# Patient Record
Sex: FEMALE | Race: BLACK OR AFRICAN AMERICAN | NOT HISPANIC OR LATINO | Employment: OTHER | URBAN - METROPOLITAN AREA
[De-identification: names, ages, dates, MRNs, and addresses within clinical notes are randomized per-mention and may not be internally consistent; named-entity substitution may affect disease eponyms.]

---

## 2017-01-06 ENCOUNTER — OFFICE VISIT (OUTPATIENT)
Dept: FAMILY MEDICINE | Facility: CLINIC | Age: 32
End: 2017-01-06
Payer: MEDICAID

## 2017-01-06 VITALS
HEIGHT: 64 IN | RESPIRATION RATE: 15 BRPM | SYSTOLIC BLOOD PRESSURE: 110 MMHG | WEIGHT: 110.25 LBS | TEMPERATURE: 99 F | DIASTOLIC BLOOD PRESSURE: 82 MMHG | BODY MASS INDEX: 18.82 KG/M2 | OXYGEN SATURATION: 96 % | HEART RATE: 68 BPM

## 2017-01-06 DIAGNOSIS — G89.29 CHRONIC BILATERAL LOW BACK PAIN WITHOUT SCIATICA: Primary | ICD-10-CM

## 2017-01-06 DIAGNOSIS — M54.50 CHRONIC BILATERAL LOW BACK PAIN WITHOUT SCIATICA: Primary | ICD-10-CM

## 2017-01-06 DIAGNOSIS — R63.4 UNINTENDED WEIGHT LOSS: ICD-10-CM

## 2017-01-06 PROCEDURE — 99214 OFFICE O/P EST MOD 30 MIN: CPT | Mod: S$PBB,,, | Performed by: FAMILY MEDICINE

## 2017-01-06 PROCEDURE — 99999 PR PBB SHADOW E&M-EST. PATIENT-LVL III: CPT | Mod: PBBFAC,,, | Performed by: FAMILY MEDICINE

## 2017-01-06 PROCEDURE — 99213 OFFICE O/P EST LOW 20 MIN: CPT | Mod: PBBFAC,PO | Performed by: FAMILY MEDICINE

## 2017-01-06 RX ORDER — TRAMADOL HYDROCHLORIDE 50 MG/1
50 TABLET ORAL 4 TIMES DAILY PRN
Refills: 2 | COMMUNITY
Start: 2016-12-10 | End: 2017-01-06 | Stop reason: SDUPTHER

## 2017-01-06 RX ORDER — CYPROHEPTADINE HYDROCHLORIDE 2 MG/5ML
4 SOLUTION ORAL
Qty: 300 ML | Refills: 2 | Status: SHIPPED | OUTPATIENT
Start: 2017-01-06 | End: 2017-03-24 | Stop reason: SDUPTHER

## 2017-01-06 RX ORDER — TRAMADOL HYDROCHLORIDE 50 MG/1
50 TABLET ORAL 4 TIMES DAILY PRN
Qty: 120 TABLET | Refills: 2 | Status: SHIPPED | OUTPATIENT
Start: 2017-01-06 | End: 2017-03-23 | Stop reason: SDUPTHER

## 2017-01-06 NOTE — PROGRESS NOTES
Chief Complaint   Patient presents with    Annual Exam    Gynecologic Exam     PAP Smear       Marlene Munoz is a 31 y.o. female who presents per the Chief Complaint.  Pt is known to me and was last seen by me on 11/11/2016.  All known chronic medical issues have been documented.  Patient unable to stay for annual exam and Pap and will return next week.       Anxiety   Presents for follow-up visit. Onset was at an unknown time. The problem has been unchanged. Symptoms include excessive worry, irritability, nervous/anxious behavior and palpitations. Patient reports no chest pain, compulsions, confusion, decreased concentration, depressed mood, dizziness, dry mouth, feeling of choking, hyperventilation, insomnia, malaise, muscle tension, nausea, obsessions, panic, restlessness, shortness of breath or suicidal ideas. Symptoms occur most days. The severity of symptoms is severe. The symptoms are aggravated by family issues. The quality of sleep is fair. Nighttime awakenings: occasional.     There are no known risk factors. Her past medical history is significant for anxiety/panic attacks. There is no history of anemia, arrhythmia, bipolar disorder, CAD, CHF, chronic lung disease, depression, fibromyalgia or hyperthyroidism. Past treatments include benzodiazephines. The treatment provided moderate relief. Compliance with prior treatments has been variable. Side effects of treatment include auditory problems, GI discomfort, headaches, joint pain, a rash, urinary problems, visual problems and limb pain.   Back Pain   This is a chronic problem. The current episode started more than 1 year ago. The problem occurs daily. The problem is unchanged. The pain is present in the lumbar spine. The quality of the pain is described as aching. The pain does not radiate. The pain is at a severity of 5/10. The pain is moderate. The pain is worse during the night. The symptoms are aggravated by sitting and twisting. Pertinent  "negatives include no abdominal pain, bladder incontinence, bowel incontinence, chest pain, dysuria, fever, headaches, leg pain, numbness, paresis, paresthesias, pelvic pain, perianal numbness, tingling, weakness or weight loss. She has tried analgesics for the symptoms. The treatment provided moderate relief.        ROS  Review of Systems   Constitutional: Positive for irritability. Negative for activity change, appetite change, chills, diaphoresis, fatigue, fever, unexpected weight change and weight loss.   HENT: Negative.  Negative for congestion, ear pain, hearing loss, nosebleeds, postnasal drip, rhinorrhea, sinus pressure, sneezing, sore throat and trouble swallowing.    Eyes: Negative for pain and visual disturbance.   Respiratory: Negative for cough, choking and shortness of breath.    Cardiovascular: Positive for palpitations. Negative for chest pain and leg swelling.   Gastrointestinal: Negative for abdominal pain, bowel incontinence, constipation, diarrhea, nausea and vomiting.   Genitourinary: Negative for bladder incontinence, difficulty urinating, dysuria, frequency, pelvic pain and urgency.   Musculoskeletal: Positive for back pain. Negative for arthralgias, gait problem, joint swelling and myalgias.   Skin: Negative.    Allergic/Immunologic: Negative for environmental allergies and food allergies.   Neurological: Negative.  Negative for dizziness, tingling, seizures, syncope, weakness, light-headedness, numbness, headaches and paresthesias.   Psychiatric/Behavioral: Negative for confusion, decreased concentration, dysphoric mood, sleep disturbance and suicidal ideas. The patient is nervous/anxious. The patient does not have insomnia.        Physical Exam  Vitals:    01/06/17 1042   BP: 110/82   Pulse: 68   Resp: 15   Temp: 98.5 °F (36.9 °C)    Body mass index is 18.92 kg/(m^2).  Weight: 50 kg (110 lb 3.7 oz)   Height: 5' 4" (162.6 cm)     Physical Exam   Constitutional: She is oriented to person, " place, and time. She appears well-developed and well-nourished. She is active and cooperative.  Non-toxic appearance. She does not have a sickly appearance. She does not appear ill. No distress.   Weight gain noted   HENT:   Head: Normocephalic and atraumatic.   Right Ear: Hearing and external ear normal. No decreased hearing is noted.   Left Ear: Hearing and external ear normal. No decreased hearing is noted.   Nose: Nose normal. No rhinorrhea or nasal deformity.   Mouth/Throat: Uvula is midline and oropharynx is clear and moist. She does not have dentures. Normal dentition.   Eyes: Conjunctivae, EOM and lids are normal. Pupils are equal, round, and reactive to light. Right eye exhibits no chemosis, no discharge and no exudate. No foreign body present in the right eye. Left eye exhibits no chemosis, no discharge and no exudate. No foreign body present in the left eye. No scleral icterus.   Neck: Normal range of motion and full passive range of motion without pain. Neck supple.   Cardiovascular: Normal rate, regular rhythm, S1 normal, S2 normal and normal heart sounds.  Exam reveals no gallop and no friction rub.    No murmur heard.  Pulmonary/Chest: Effort normal and breath sounds normal. No accessory muscle usage. No respiratory distress. She has no decreased breath sounds. She has no wheezes. She has no rhonchi. She has no rales.   Abdominal: Soft. Normal appearance. She exhibits no distension. There is no hepatosplenomegaly. There is no tenderness. There is no rigidity, no rebound and no guarding.   Musculoskeletal: Normal range of motion.        Thoracic back: She exhibits tenderness, bony tenderness and pain. She exhibits normal range of motion, no swelling, no edema, no deformity, no laceration, no spasm and normal pulse.        Lumbar back: She exhibits tenderness, bony tenderness and pain. She exhibits normal range of motion, no swelling, no edema, no deformity, no laceration, no spasm and normal pulse.    Neurological: She is alert and oriented to person, place, and time. She has normal strength. No cranial nerve deficit or sensory deficit. She exhibits normal muscle tone. She displays no seizure activity. Coordination and gait normal.   Skin: Skin is warm, dry and intact. No rash noted. She is not diaphoretic.   Psychiatric: She has a normal mood and affect. Her speech is normal and behavior is normal. Judgment and thought content normal. Cognition and memory are normal. She is attentive.       Assessment & Plan    1. Chronic bilateral low back pain without sciatica  Discussed rules regarding chronic pain management with opiate/opioid therapy.  Discussed use of alternative medications to manage pain with goal of reducing and possibly discontinue opioid therapy, and advised that in order to continue current therapy they would need to  paper prescription every month and schedule appointment at least once every 90 days, as well as consent to random urine drug screening.  The legitimate medical purpose of using a controlled substance for pain management is chronic back pain.  Patient has been screened through the [facility] and is not receiving controlled substances from any other provider.  At this time, I have no suspicion of illegal activity and feel that with proper usage, there is low risk for overdose.     - tramadol (ULTRAM) 50 mg tablet; Take 1 tablet (50 mg total) by mouth 4 (four) times daily as needed.  Dispense: 120 tablet; Refill: 2    2. Unintended weight loss  Will restart medication for appetite stimulation; weight loss noted with discontinuation.  - cyproheptadine (,PERIACTIN,) 2 mg/5 mL syrup; Take 10 mLs (4 mg total) by mouth 3 (three) times daily before meals.  Dispense: 300 mL; Refill: 2      Follow up documented    ACTIVE MEDICAL ISSUES:  Documented in Problem List    PAST MEDICAL HISTORY  Documented    PAST SURGICAL HISTORY:  Documented    SOCIAL HISTORY:  Documented    FAMILY  HISTORY:  Documented    ALLERGIES AND MEDICATIONS: updated and reviewed.  Documented    Health Maintenance       Date Due Completion Date    TETANUS VACCINE 2/17/2005 ---    Influenza Vaccine 8/1/2016 12/1/2015    Override on 12/1/2015: Done (today)    Pap Smear 12/1/2016 12/1/2015 (Done)    Override on 12/1/2015: Done (had one done last months 11/2015 [facility] [place])

## 2017-01-25 DIAGNOSIS — F41.9 ANXIETY: ICD-10-CM

## 2017-01-25 RX ORDER — BUSPIRONE HYDROCHLORIDE 15 MG/1
15 TABLET ORAL 2 TIMES DAILY
Qty: 60 TABLET | Refills: 2 | Status: SHIPPED | OUTPATIENT
Start: 2017-01-25 | End: 2018-01-12

## 2017-03-23 ENCOUNTER — OFFICE VISIT (OUTPATIENT)
Dept: FAMILY MEDICINE | Facility: CLINIC | Age: 32
End: 2017-03-23
Payer: MEDICAID

## 2017-03-23 VITALS
DIASTOLIC BLOOD PRESSURE: 60 MMHG | TEMPERATURE: 98 F | HEIGHT: 64 IN | HEART RATE: 102 BPM | RESPIRATION RATE: 12 BRPM | SYSTOLIC BLOOD PRESSURE: 104 MMHG | WEIGHT: 111.31 LBS | BODY MASS INDEX: 19 KG/M2

## 2017-03-23 DIAGNOSIS — M54.50 CHRONIC BILATERAL LOW BACK PAIN WITHOUT SCIATICA: ICD-10-CM

## 2017-03-23 DIAGNOSIS — F41.1 GENERALIZED ANXIETY DISORDER: ICD-10-CM

## 2017-03-23 DIAGNOSIS — Z11.3 SCREEN FOR STD (SEXUALLY TRANSMITTED DISEASE): ICD-10-CM

## 2017-03-23 DIAGNOSIS — Z01.419 WELL WOMAN EXAM WITH ROUTINE GYNECOLOGICAL EXAM: Primary | ICD-10-CM

## 2017-03-23 DIAGNOSIS — G89.29 CHRONIC BILATERAL LOW BACK PAIN WITHOUT SCIATICA: ICD-10-CM

## 2017-03-23 PROCEDURE — 99215 OFFICE O/P EST HI 40 MIN: CPT | Mod: S$PBB,,, | Performed by: FAMILY MEDICINE

## 2017-03-23 PROCEDURE — 87591 N.GONORRHOEAE DNA AMP PROB: CPT

## 2017-03-23 PROCEDURE — 88175 CYTOPATH C/V AUTO FLUID REDO: CPT

## 2017-03-23 PROCEDURE — 99999 PR PBB SHADOW E&M-EST. PATIENT-LVL III: CPT | Mod: PBBFAC,,, | Performed by: FAMILY MEDICINE

## 2017-03-23 PROCEDURE — 99213 OFFICE O/P EST LOW 20 MIN: CPT | Mod: PBBFAC,PO | Performed by: FAMILY MEDICINE

## 2017-03-23 PROCEDURE — 87480 CANDIDA DNA DIR PROBE: CPT

## 2017-03-23 RX ORDER — CLONAZEPAM 1 MG/1
1 TABLET ORAL 2 TIMES DAILY PRN
Qty: 60 TABLET | Refills: 2 | Status: SHIPPED | OUTPATIENT
Start: 2017-03-23 | End: 2017-06-14 | Stop reason: SDUPTHER

## 2017-03-23 RX ORDER — TRAMADOL HYDROCHLORIDE 50 MG/1
50 TABLET ORAL 4 TIMES DAILY PRN
Qty: 120 TABLET | Refills: 2 | Status: SHIPPED | OUTPATIENT
Start: 2017-03-23 | End: 2017-06-14 | Stop reason: SDUPTHER

## 2017-03-23 NOTE — PROGRESS NOTES
Chief Complaint   Patient presents with    Annual Exam       Marlene Munoz is a 32 y.o. female who presents per the Chief Complaint.  Pt is known to me and was last seen by me on 1/6/2017.  All known chronic medical issues have been documented.       Anxiety   Presents for follow-up visit. Onset was at an unknown time. The problem has been unchanged. Symptoms include excessive worry, insomnia, irritability, nervous/anxious behavior and palpitations. Patient reports no chest pain, compulsions, confusion, decreased concentration, depressed mood, dizziness, dry mouth, feeling of choking, hyperventilation, malaise, muscle tension, nausea, obsessions, panic, restlessness, shortness of breath or suicidal ideas. Symptoms occur most days. The severity of symptoms is severe and causing significant distress. The symptoms are aggravated by family issues. The quality of sleep is fair. Nighttime awakenings: occasional.     There are no known risk factors. Her past medical history is significant for anxiety/panic attacks. There is no history of anemia, arrhythmia, bipolar disorder, CAD, CHF, chronic lung disease, depression, fibromyalgia or hyperthyroidism. Past treatments include benzodiazephines. The treatment provided moderate relief. Compliance with prior treatments has been variable. Side effects of treatment include auditory problems, GI discomfort, headaches, joint pain, a rash, urinary problems, visual problems and limb pain.   Back Pain   This is a chronic problem. The current episode started more than 1 year ago. The problem occurs daily. The problem is unchanged. The pain is present in the lumbar spine. The quality of the pain is described as aching. The pain does not radiate. The pain is at a severity of 5/10. The pain is moderate. The pain is worse during the night. The symptoms are aggravated by sitting and twisting. Pertinent negatives include no abdominal pain, bladder incontinence, bowel incontinence, chest  "pain, dysuria, fever, headaches, leg pain, numbness, paresis, paresthesias, pelvic pain, perianal numbness, tingling, weakness or weight loss. She has tried analgesics for the symptoms. The treatment provided moderate relief.        ROS  Review of Systems   Constitutional: Positive for irritability. Negative for activity change, appetite change, chills, diaphoresis, fatigue, fever, unexpected weight change and weight loss.   HENT: Negative.  Negative for congestion, ear pain, hearing loss, nosebleeds, postnasal drip, rhinorrhea, sinus pressure, sneezing, sore throat and trouble swallowing.    Eyes: Negative for pain and visual disturbance.   Respiratory: Negative for cough, choking and shortness of breath.    Cardiovascular: Positive for palpitations. Negative for chest pain and leg swelling.   Gastrointestinal: Negative for abdominal pain, bowel incontinence, constipation, diarrhea, nausea and vomiting.   Genitourinary: Negative for bladder incontinence, difficulty urinating, dysuria, frequency, pelvic pain and urgency.   Musculoskeletal: Positive for back pain. Negative for arthralgias, gait problem, joint swelling and myalgias.   Skin: Negative.    Allergic/Immunologic: Negative for environmental allergies and food allergies.   Neurological: Negative.  Negative for dizziness, tingling, seizures, syncope, weakness, light-headedness, numbness, headaches and paresthesias.   Psychiatric/Behavioral: Positive for sleep disturbance. Negative for confusion, decreased concentration, dysphoric mood and suicidal ideas. The patient is nervous/anxious and has insomnia.        Physical Exam  Vitals:    03/23/17 0837   BP: 104/60   Pulse: 102   Resp: 12   Temp: 98.3 °F (36.8 °C)    Body mass index is 19.11 kg/(m^2).  Weight: 50.5 kg (111 lb 5.3 oz)   Height: 5' 4" (162.6 cm)     Physical Exam   Constitutional: She is oriented to person, place, and time. She appears well-developed and well-nourished. She is active and " cooperative.  Non-toxic appearance. She does not have a sickly appearance. She does not appear ill. No distress.   Weight gain maintained; slight increase noted.   HENT:   Head: Normocephalic and atraumatic.   Right Ear: Hearing and external ear normal. No decreased hearing is noted.   Left Ear: Hearing and external ear normal. No decreased hearing is noted.   Nose: Nose normal. No rhinorrhea or nasal deformity.   Mouth/Throat: Uvula is midline and oropharynx is clear and moist. She does not have dentures. Normal dentition.   Eyes: Conjunctivae, EOM and lids are normal. Pupils are equal, round, and reactive to light. Right eye exhibits no chemosis, no discharge and no exudate. No foreign body present in the right eye. Left eye exhibits no chemosis, no discharge and no exudate. No foreign body present in the left eye. No scleral icterus.   Neck: Normal range of motion and full passive range of motion without pain. Neck supple.   Cardiovascular: Normal rate, regular rhythm, S1 normal, S2 normal and normal heart sounds.  Exam reveals no gallop and no friction rub.    No murmur heard.  Pulmonary/Chest: Effort normal and breath sounds normal. No accessory muscle usage. No respiratory distress. She has no decreased breath sounds. She has no wheezes. She has no rhonchi. She has no rales.   Abdominal: Soft. Normal appearance. She exhibits no distension. There is no hepatosplenomegaly. There is no tenderness. There is no rigidity, no rebound and no guarding.   Genitourinary: Rectum normal, vagina normal and uterus normal. Pelvic exam was performed with patient supine. No labial fusion. There is no rash, tenderness, lesion or injury on the right labia. There is no rash, tenderness, lesion or injury on the left labia. Cervix exhibits discharge. Cervix exhibits no motion tenderness and no friability. Right adnexum displays no mass, no tenderness and no fullness. Left adnexum displays no mass, no tenderness and no fullness. No  erythema, tenderness or bleeding in the vagina. No foreign body in the vagina. No signs of injury around the vagina. No vaginal discharge found.   Musculoskeletal: Normal range of motion.        Thoracic back: She exhibits tenderness, bony tenderness and pain. She exhibits normal range of motion, no swelling, no edema, no deformity, no laceration, no spasm and normal pulse.        Lumbar back: She exhibits tenderness, bony tenderness and pain. She exhibits normal range of motion, no swelling, no edema, no deformity, no laceration, no spasm and normal pulse.   Neurological: She is alert and oriented to person, place, and time. She has normal strength. No cranial nerve deficit or sensory deficit. She exhibits normal muscle tone. She displays no seizure activity. Coordination and gait normal.   Skin: Skin is warm, dry and intact. No rash noted. She is not diaphoretic.   Psychiatric: She has a normal mood and affect. Her speech is normal and behavior is normal. Judgment and thought content normal. Cognition and memory are normal. She is attentive.       Assessment & Plan    1. Well woman exam with routine gynecological exam  Pap test was performed without complication.  No abnormalities noted.  Cervix and adnexa are normal.  Sample was collected and will be sent to lab for evaluation.  Patient will be notified of results once available, and will schedule follow up if necessary.   - Liquid-based pap smear, screening  - Vaginosis Screen by DNA Probe    2. Chronic bilateral low back pain without sciatica  Discussed rules regarding chronic pain management with opiate/opioid therapy.  Discussed use of alternative medications to manage pain with goal of reducing and possibly discontinue opioid therapy, and advised that in order to continue current therapy they would need to  paper prescription every month and schedule appointment at least once every 90 days, as well as consent to random urine drug screening.  The  legitimate medical purpose of using a controlled substance for pain management is chronic back pain.  Patient has been [place] through the [place] and is not receiving controlled substances from any other provider.  At this time, I have no suspicion of illegal activity and feel that with proper usage, there is low risk for overdose.     - tramadol (ULTRAM) 50 mg tablet; Take 1 tablet (50 mg total) by mouth 4 (four) times daily as needed.  Dispense: 120 tablet; Refill: 2    3. Generalized anxiety disorder  Will start long acting BZD as patient has not had success in the past with other medications.  Advised to use with caution no more than twice daily.  - clonazePAM (KLONOPIN) 1 MG tablet; Take 1 tablet (1 mg total) by mouth 2 (two) times daily as needed for Anxiety.  Dispense: 60 tablet; Refill: 2    4. Screen for STD (sexually transmitted disease)  Screening test will be ordered and once results available patient will be notified of results and managed accordingly.    - C. trachomatis/N. gonorrhoeae by AMP DNA Cervicovaginal          Follow up documented    ACTIVE MEDICAL ISSUES:  Documented in Problem List    PAST MEDICAL HISTORY  Documented    PAST SURGICAL HISTORY:  Documented    SOCIAL HISTORY:  Documented    FAMILY HISTORY:  Documented    ALLERGIES AND MEDICATIONS: updated and reviewed.  Documented    Health Maintenance       Date Due Completion Date    TETANUS VACCINE 2/17/2005 ---    Pap Smear 12/1/2016 12/1/2015 (Done)    Override on 12/1/2015: Done ([facility]

## 2017-03-24 DIAGNOSIS — R63.4 UNINTENDED WEIGHT LOSS: ICD-10-CM

## 2017-03-24 LAB
CANDIDA RRNA VAG QL PROBE: NEGATIVE
G VAGINALIS RRNA GENITAL QL PROBE: POSITIVE
T VAGINALIS RRNA GENITAL QL PROBE: POSITIVE

## 2017-03-24 RX ORDER — CYPROHEPTADINE HYDROCHLORIDE 2 MG/5ML
4 SOLUTION ORAL
Qty: 300 ML | Refills: 2 | Status: SHIPPED | OUTPATIENT
Start: 2017-03-24 | End: 2017-07-27 | Stop reason: SDUPTHER

## 2017-03-27 LAB
C TRACH DNA SPEC QL NAA+PROBE: NEGATIVE
N GONORRHOEA DNA SPEC QL NAA+PROBE: NEGATIVE

## 2017-03-31 DIAGNOSIS — N76.0 BV (BACTERIAL VAGINOSIS): ICD-10-CM

## 2017-03-31 DIAGNOSIS — B96.89 BV (BACTERIAL VAGINOSIS): ICD-10-CM

## 2017-03-31 DIAGNOSIS — A59.01 TRICHOMONAS VAGINITIS: ICD-10-CM

## 2017-03-31 DIAGNOSIS — A59.01 TRICHOMONAS VAGINITIS: Primary | ICD-10-CM

## 2017-03-31 RX ORDER — METRONIDAZOLE 500 MG/1
1000 TABLET ORAL EVERY 12 HOURS
Qty: 4 TABLET | Refills: 0 | Status: SHIPPED | OUTPATIENT
Start: 2017-03-31 | End: 2017-03-31 | Stop reason: SDUPTHER

## 2017-03-31 RX ORDER — METRONIDAZOLE 500 MG/1
1000 TABLET ORAL EVERY 12 HOURS
Qty: 4 TABLET | Refills: 0 | Status: SHIPPED | OUTPATIENT
Start: 2017-03-31 | End: 2017-04-01

## 2017-03-31 NOTE — TELEPHONE ENCOUNTER
----- Message from Joanna Lomeli PA-C sent at 3/31/2017  8:30 AM CDT -----  Pap is normal. Positive for trichomonas (STD) and bacterial vaginosis. Treatment is the same for both. Patients partner will need to be treated before resuming intercourse.

## 2017-03-31 NOTE — TELEPHONE ENCOUNTER
Patient advised of lab results script and recommendations regarding partner.  Patient verbalized understanding.      Requested to have script sent to [facility]. Please advise.

## 2017-05-26 DIAGNOSIS — M54.50 CHRONIC BILATERAL LOW BACK PAIN WITHOUT SCIATICA: ICD-10-CM

## 2017-05-26 DIAGNOSIS — G89.29 CHRONIC BILATERAL LOW BACK PAIN WITHOUT SCIATICA: ICD-10-CM

## 2017-05-26 DIAGNOSIS — F41.1 GENERALIZED ANXIETY DISORDER: ICD-10-CM

## 2017-05-26 NOTE — TELEPHONE ENCOUNTER
LOV  3/23/2017  Last refill 3/23/2017 - klonopin                  3/23/2017 - ultram    Patient requesting increased dose of klonopin.  Stated current dose is not effective.  Please advise.

## 2017-05-26 NOTE — TELEPHONE ENCOUNTER
----- Message from Leslie Garcia sent at 5/26/2017  3:06 PM CDT -----  Contact: self  Pt is requesting a refill and an increase in dosage for clonazePAM (KLONOPIN) 1 MG tablet. Pt states current dosage does not work. Also requesting a refill for tramadol (ULTRAM) 50 mg tablet.         Thanks

## 2017-05-29 RX ORDER — CLONAZEPAM 1 MG/1
TABLET ORAL 2 TIMES DAILY PRN
Qty: 60 TABLET | Refills: 2 | OUTPATIENT
Start: 2017-05-29 | End: 2018-05-29

## 2017-05-29 RX ORDER — TRAMADOL HYDROCHLORIDE 50 MG/1
50 TABLET ORAL 4 TIMES DAILY PRN
Qty: 120 TABLET | Refills: 2 | OUTPATIENT
Start: 2017-05-29 | End: 2017-08-27

## 2017-06-14 ENCOUNTER — OFFICE VISIT (OUTPATIENT)
Dept: FAMILY MEDICINE | Facility: CLINIC | Age: 32
End: 2017-06-14
Payer: MEDICAID

## 2017-06-14 VITALS
SYSTOLIC BLOOD PRESSURE: 90 MMHG | TEMPERATURE: 99 F | RESPIRATION RATE: 15 BRPM | DIASTOLIC BLOOD PRESSURE: 62 MMHG | OXYGEN SATURATION: 99 % | WEIGHT: 103.19 LBS | HEART RATE: 76 BPM | BODY MASS INDEX: 17.62 KG/M2 | HEIGHT: 64 IN

## 2017-06-14 DIAGNOSIS — F41.1 GENERALIZED ANXIETY DISORDER: ICD-10-CM

## 2017-06-14 DIAGNOSIS — M54.50 CHRONIC BILATERAL LOW BACK PAIN WITHOUT SCIATICA: ICD-10-CM

## 2017-06-14 DIAGNOSIS — G89.29 CHRONIC BILATERAL LOW BACK PAIN WITHOUT SCIATICA: ICD-10-CM

## 2017-06-14 PROCEDURE — 99215 OFFICE O/P EST HI 40 MIN: CPT | Mod: S$PBB,,, | Performed by: FAMILY MEDICINE

## 2017-06-14 PROCEDURE — 99213 OFFICE O/P EST LOW 20 MIN: CPT | Mod: PBBFAC,PO | Performed by: FAMILY MEDICINE

## 2017-06-14 PROCEDURE — 99999 PR PBB SHADOW E&M-EST. PATIENT-LVL III: CPT | Mod: PBBFAC,,, | Performed by: FAMILY MEDICINE

## 2017-06-14 RX ORDER — TRAMADOL HYDROCHLORIDE 50 MG/1
50 TABLET ORAL 4 TIMES DAILY PRN
Qty: 120 TABLET | Refills: 2 | Status: SHIPPED | OUTPATIENT
Start: 2017-06-14 | End: 2017-08-23 | Stop reason: SDUPTHER

## 2017-06-14 RX ORDER — OXYCODONE AND ACETAMINOPHEN 5; 325 MG/1; MG/1
TABLET ORAL
Refills: 0 | COMMUNITY
Start: 2017-04-11 | End: 2017-06-14

## 2017-06-14 RX ORDER — CLONAZEPAM 2 MG/1
2 TABLET ORAL 2 TIMES DAILY PRN
Qty: 60 TABLET | Refills: 2 | Status: SHIPPED | OUTPATIENT
Start: 2017-06-14 | End: 2017-08-23 | Stop reason: SDUPTHER

## 2017-06-14 RX ORDER — IBUPROFEN 800 MG/1
800 TABLET ORAL EVERY 8 HOURS PRN
Refills: 0 | COMMUNITY
Start: 2017-04-11 | End: 2018-01-12

## 2017-06-14 NOTE — PROGRESS NOTES
No chief complaint on file.      Marlene Munoz is a 32 y.o. female who presents per the Chief Complaint.  Pt is known to me and was last seen by me on 3/23/2017.  All known chronic medical issues have been documented.       Anxiety   Presents for follow-up visit. Onset was at an unknown time. The problem has been unchanged. Symptoms include excessive worry, irritability, nervous/anxious behavior and palpitations. Patient reports no chest pain, compulsions, confusion, decreased concentration, depressed mood, dizziness, dry mouth, feeling of choking, hyperventilation, insomnia, malaise, muscle tension, nausea, obsessions, panic, restlessness, shortness of breath or suicidal ideas. Symptoms occur most days. The severity of symptoms is severe. The symptoms are aggravated by family issues. The quality of sleep is fair. Nighttime awakenings: occasional.     There are no known risk factors. Her past medical history is significant for anxiety/panic attacks. There is no history of anemia, arrhythmia, bipolar disorder, CAD, CHF, chronic lung disease, depression, fibromyalgia or hyperthyroidism. Past treatments include benzodiazephines. The treatment provided moderate relief. Compliance with prior treatments has been variable. Side effects of treatment include auditory problems, GI discomfort, headaches, joint pain, a rash, urinary problems, visual problems and limb pain.   Back Pain   This is a chronic problem. The current episode started more than 1 year ago. The problem occurs daily. The problem is unchanged. The pain is present in the lumbar spine. The quality of the pain is described as aching. The pain does not radiate. The pain is at a severity of 5/10. The pain is moderate. The pain is worse during the night. The symptoms are aggravated by sitting and twisting. Pertinent negatives include no abdominal pain, bladder incontinence, bowel incontinence, chest pain, dysuria, fever, headaches, leg pain, numbness,  "paresis, paresthesias, pelvic pain, perianal numbness, tingling, weakness or weight loss. She has tried analgesics for the symptoms. The treatment provided moderate relief.        ROS  Review of Systems   Constitutional: Positive for irritability and unexpected weight change. Negative for activity change, appetite change, chills, diaphoresis, fatigue, fever and weight loss.   HENT: Negative.  Negative for congestion, ear pain, hearing loss, nosebleeds, postnasal drip, rhinorrhea, sinus pressure, sneezing, sore throat and trouble swallowing.    Eyes: Negative for pain and visual disturbance.   Respiratory: Negative for cough, choking and shortness of breath.    Cardiovascular: Positive for palpitations. Negative for chest pain and leg swelling.   Gastrointestinal: Negative for abdominal pain, bowel incontinence, constipation, diarrhea, nausea and vomiting.   Genitourinary: Negative for bladder incontinence, difficulty urinating, dysuria, frequency, pelvic pain and urgency.   Musculoskeletal: Positive for back pain. Negative for arthralgias, gait problem, joint swelling and myalgias.   Skin: Negative.    Allergic/Immunologic: Negative for environmental allergies and food allergies.   Neurological: Negative.  Negative for dizziness, tingling, seizures, syncope, weakness, light-headedness, numbness, headaches and paresthesias.   Psychiatric/Behavioral: Positive for agitation, dysphoric mood and sleep disturbance. Negative for behavioral problems, confusion, decreased concentration, hallucinations and suicidal ideas. The patient is nervous/anxious. The patient does not have insomnia and is not hyperactive.        Physical Exam  Vitals:    06/14/17 1044   BP: 90/62   Pulse: 76   Resp: 15   Temp: 98.6 °F (37 °C)    Body mass index is 17.71 kg/m².  Weight: 46.8 kg (103 lb 2.8 oz)   Height: 5' 4" (162.6 cm)     Physical Exam   Constitutional: She is oriented to person, place, and time. She appears well-developed and " well-nourished. She is active and cooperative.  Non-toxic appearance. She does not have a sickly appearance. She does not appear ill. No distress.   Weight loss noted at this visit   HENT:   Head: Normocephalic and atraumatic.   Right Ear: Hearing and external ear normal. No decreased hearing is noted.   Left Ear: Hearing and external ear normal. No decreased hearing is noted.   Nose: Nose normal. No rhinorrhea or nasal deformity.   Mouth/Throat: Uvula is midline and oropharynx is clear and moist. She does not have dentures. Normal dentition.   Eyes: Conjunctivae, EOM and lids are normal. Pupils are equal, round, and reactive to light. Right eye exhibits no chemosis, no discharge and no exudate. No foreign body present in the right eye. Left eye exhibits no chemosis, no discharge and no exudate. No foreign body present in the left eye. No scleral icterus.   Neck: Normal range of motion and full passive range of motion without pain. Neck supple.   Cardiovascular: Normal rate, regular rhythm, S1 normal, S2 normal and normal heart sounds.  Exam reveals no gallop and no friction rub.    No murmur heard.  Pulmonary/Chest: Effort normal and breath sounds normal. No accessory muscle usage. No respiratory distress. She has no decreased breath sounds. She has no wheezes. She has no rhonchi. She has no rales.   Abdominal: Soft. Normal appearance. She exhibits no distension. There is no hepatosplenomegaly. There is no tenderness. There is no rigidity, no rebound and no guarding.   Musculoskeletal: Normal range of motion.        Thoracic back: She exhibits tenderness, bony tenderness and pain. She exhibits normal range of motion, no swelling, no edema, no deformity, no laceration, no spasm and normal pulse.        Lumbar back: She exhibits tenderness, bony tenderness and pain. She exhibits normal range of motion, no swelling, no edema, no deformity, no laceration, no spasm and normal pulse.   Neurological: She is alert and  oriented to person, place, and time. She has normal strength. No cranial nerve deficit or sensory deficit. She exhibits normal muscle tone. She displays no seizure activity. Coordination and gait normal.   Skin: Skin is warm, dry and intact. No rash noted. She is not diaphoretic.   Psychiatric: She has a normal mood and affect. Her speech is normal and behavior is normal. Judgment and thought content normal. Cognition and memory are normal. She is attentive.       Assessment & Plan    1. Chronic bilateral low back pain without sciatica  Discussed rules regarding chronic pain management with opiate/opioid therapy.  Discussed use of alternative medications to manage pain with goal of reducing and possibly discontinue opioid therapy, and advised that in order to continue current therapy they would need to  paper prescription every month and schedule appointment at least once every 90 days, as well as consent to random urine drug screening.  The legitimate medical purpose of using a controlled substance for pain management is chronic back pain.  Patient has been screened through the [facility] and is not receiving controlled substances from any other provider.  At this time, I have no suspicion of illegal activity and feel that with proper usage, there is low risk for overdose.     - tramadol (ULTRAM) 50 mg tablet; Take 1 tablet (50 mg total) by mouth 4 (four) times daily as needed.  Dispense: 120 tablet; Refill: 2    2. Generalized anxiety disorder  Will continue at higher dose; patient with social/personal issues that aggravate her condition.  - clonazePAM (KLONOPIN) 2 MG Tab; Take 1 tablet (2 mg total) by mouth 2 (two) times daily as needed for Anxiety.  Dispense: 60 tablet; Refill: 2      Follow up documented    ACTIVE MEDICAL ISSUES:  Documented in Problem List    PAST MEDICAL HISTORY  Documented    PAST SURGICAL HISTORY:  Documented    SOCIAL HISTORY:  Documented    FAMILY  HISTORY:  Documented    ALLERGIES AND MEDICATIONS: updated and reviewed.  Documented    Health Maintenance       Date Due Completion Date    TETANUS VACCINE 02/17/2005 ---    Influenza Vaccine 08/01/2017 3/23/2017 (Declined)    Override on 3/23/2017: Declined    Override on 12/1/2015: Done (today)    Pap Smear 03/23/2018 3/23/2017    Override on 12/1/2015: Done ([facility]

## 2017-06-19 ENCOUNTER — TELEPHONE (OUTPATIENT)
Dept: FAMILY MEDICINE | Facility: CLINIC | Age: 32
End: 2017-06-19

## 2017-06-19 NOTE — TELEPHONE ENCOUNTER
----- Message from Diya Snyder sent at 6/19/2017  3:51 PM CDT -----  Contact: self  [phone]  Pt is requesting a script for a boil in her vagina . Pls call pt [phone]. Thanks......Ana M

## 2017-07-27 ENCOUNTER — OFFICE VISIT (OUTPATIENT)
Dept: FAMILY MEDICINE | Facility: CLINIC | Age: 32
End: 2017-07-27
Payer: MEDICAID

## 2017-07-27 VITALS
HEIGHT: 64 IN | TEMPERATURE: 98 F | WEIGHT: 108.69 LBS | HEART RATE: 82 BPM | OXYGEN SATURATION: 98 % | RESPIRATION RATE: 12 BRPM | SYSTOLIC BLOOD PRESSURE: 108 MMHG | DIASTOLIC BLOOD PRESSURE: 66 MMHG | BODY MASS INDEX: 18.56 KG/M2

## 2017-07-27 DIAGNOSIS — M54.50 CHRONIC BILATERAL LOW BACK PAIN WITHOUT SCIATICA: Primary | ICD-10-CM

## 2017-07-27 DIAGNOSIS — B96.89 BACTERIAL SINUSITIS: ICD-10-CM

## 2017-07-27 DIAGNOSIS — G89.29 CHRONIC BILATERAL LOW BACK PAIN WITHOUT SCIATICA: Primary | ICD-10-CM

## 2017-07-27 DIAGNOSIS — J32.9 BACTERIAL SINUSITIS: ICD-10-CM

## 2017-07-27 DIAGNOSIS — R63.4 UNINTENDED WEIGHT LOSS: ICD-10-CM

## 2017-07-27 DIAGNOSIS — Z13.6 SCREENING FOR CARDIOVASCULAR CONDITION: ICD-10-CM

## 2017-07-27 PROCEDURE — 99214 OFFICE O/P EST MOD 30 MIN: CPT | Mod: S$PBB,,, | Performed by: FAMILY MEDICINE

## 2017-07-27 PROCEDURE — 99999 PR PBB SHADOW E&M-EST. PATIENT-LVL III: CPT | Mod: PBBFAC,,, | Performed by: FAMILY MEDICINE

## 2017-07-27 PROCEDURE — 99213 OFFICE O/P EST LOW 20 MIN: CPT | Mod: PBBFAC,PO | Performed by: FAMILY MEDICINE

## 2017-07-27 RX ORDER — METHYLPREDNISOLONE 4 MG/1
TABLET ORAL
Qty: 1 PACKAGE | Refills: 0 | Status: SHIPPED | OUTPATIENT
Start: 2017-07-27 | End: 2018-01-12

## 2017-07-27 RX ORDER — CYPROHEPTADINE HYDROCHLORIDE 2 MG/5ML
4 SOLUTION ORAL
Qty: 300 ML | Refills: 2 | Status: SHIPPED | OUTPATIENT
Start: 2017-07-27 | End: 2017-11-14 | Stop reason: SDUPTHER

## 2017-07-27 RX ORDER — AZITHROMYCIN 250 MG/1
TABLET, FILM COATED ORAL
Qty: 6 TABLET | Refills: 0 | Status: SHIPPED | OUTPATIENT
Start: 2017-07-27 | End: 2018-02-23 | Stop reason: ALTCHOICE

## 2017-07-27 NOTE — PROGRESS NOTES
Chief Complaint   Patient presents with    Sinus Problem     sinus wants medication for it , has papers to be filled out       Marlene Munoz is a 32 y.o. female who presents per the Chief Complaint.  Pt is known to me and was last seen by me on 6/14/2017.  All known chronic medical issues have been documented.       Sinus Problem   This is a new problem. The current episode started 1 to 4 weeks ago. The problem is unchanged. The maximum temperature recorded prior to her arrival was 100.4 - 100.9 F. The fever has been present for 1 to 2 days. Her pain is at a severity of 4/10. The pain is moderate. Associated symptoms include chills, congestion, coughing, headaches and sinus pressure. Pertinent negatives include no diaphoresis, ear pain, hoarse voice, neck pain, shortness of breath, sneezing, sore throat or swollen glands. Past treatments include oral decongestants. The treatment provided no relief.   Back Pain   This is a chronic problem. The current episode started more than 1 year ago. The problem occurs daily. The problem is unchanged. The pain is present in the lumbar spine. The quality of the pain is described as aching. The pain does not radiate. The pain is at a severity of 5/10. The pain is moderate. The pain is worse during the night. The symptoms are aggravated by sitting and twisting. Associated symptoms include headaches. Pertinent negatives include no abdominal pain, bowel incontinence, chest pain, dysuria, fever, leg pain, numbness, paresis, paresthesias, perianal numbness, tingling, weakness or weight loss. She has tried analgesics for the symptoms. The treatment provided moderate relief.        ROS  Review of Systems   Constitutional: Positive for chills and irritability. Negative for diaphoresis, fever and weight loss.   HENT: Positive for congestion, postnasal drip, rhinorrhea and sinus pressure. Negative for dental problem, drooling, ear discharge, ear pain, facial swelling, hearing loss,  "hoarse voice, mouth sores, nosebleeds, sneezing, sore throat and trouble swallowing.    Eyes: Negative for pain and discharge.   Respiratory: Positive for cough. Negative for shortness of breath.    Cardiovascular: Positive for palpitations. Negative for chest pain.   Gastrointestinal: Negative for abdominal pain, bowel incontinence, constipation, diarrhea, nausea and vomiting.   Genitourinary: Negative for difficulty urinating, dysuria, flank pain and urgency.   Musculoskeletal: Positive for back pain. Negative for myalgias and neck pain.   Skin: Negative.    Allergic/Immunologic: Positive for environmental allergies. Negative for food allergies and immunocompromised state.   Neurological: Positive for headaches. Negative for dizziness, tingling, weakness, light-headedness, numbness and paresthesias.   Psychiatric/Behavioral: Negative.        Physical Exam  Vitals:    07/27/17 1315   BP: 108/66   Pulse: 82   Resp: 12   Temp: 98.4 °F (36.9 °C)    Body mass index is 18.66 kg/m².  Weight: 49.3 kg (108 lb 11 oz)   Height: 5' 4" (162.6 cm)     Physical Exam    Assessment & Plan    1. Chronic bilateral low back pain without sciatica  Advised that tramadol prescription will be decreased to 90 tablets at next refill, then to 60, and lastly to 30, then discontinued after that.  Advised that NSAID therapy should be effective with stretching and will consider physical therapy if no improvement.    2. Bacterial sinusitis  Symptoms of fever and pain suggest bacterial infection; symptoms persisted after initial sinus issues.  Patient was given a prescription for antibiotics and was informed that this is to prevent worsening of condition, but may not relieve the current symptoms.  Short course of oral steroid given for symptom relief.    - azithromycin (Z-VIVIENNE) 250 MG tablet; Take 2 tablets by mouth on day 1; Take 1 tablet by mouth on days 2-5  Dispense: 6 tablet; Refill: 0  - methylPREDNISolone (MEDROL DOSEPACK) 4 mg tablet; use " as directed  Dispense: 1 Package; Refill: 0    3. Unintended weight loss  The current medical regimen is effective at this time and no changes to present plan or medications will be made at this visit.  Weight fluctuates but is increasing.  - cyproheptadine (,PERIACTIN,) 2 mg/5 mL syrup; Take 10 mLs (4 mg total) by mouth 3 (three) times daily before meals.  Dispense: 300 mL; Refill: 2    4. Screening for cardiovascular condition  Screening test will be ordered and once results available patient will be notified of results and managed accordingly.    - CBC auto differential; Future  - Comprehensive metabolic panel; Future  - Lipid panel; Future      Follow up documented    ACTIVE MEDICAL ISSUES:  Documented in Problem List    PAST MEDICAL HISTORY  Documented    PAST SURGICAL HISTORY:  Documented    SOCIAL HISTORY:  Documented    FAMILY HISTORY:  Documented    ALLERGIES AND MEDICATIONS: updated and reviewed.  Documented    Health Maintenance       Date Due Completion Date    TETANUS VACCINE 02/17/2005 ---    Influenza Vaccine 08/01/2017 3/23/2017 (Declined)    Override on 3/23/2017: Declined    Override on 12/1/2015: Done (today)    Pap Smear 03/23/2018 3/23/2017    Override on 12/1/2015: Done ([facility])

## 2017-07-28 ENCOUNTER — LAB VISIT (OUTPATIENT)
Dept: LAB | Facility: HOSPITAL | Age: 32
End: 2017-07-28
Attending: FAMILY MEDICINE
Payer: MEDICAID

## 2017-07-28 DIAGNOSIS — Z13.6 SCREENING FOR CARDIOVASCULAR CONDITION: ICD-10-CM

## 2017-07-28 LAB
ALBUMIN SERPL BCP-MCNC: 3.1 G/DL
ALP SERPL-CCNC: 55 U/L
ALT SERPL W/O P-5'-P-CCNC: 7 U/L
ANION GAP SERPL CALC-SCNC: 9 MMOL/L
AST SERPL-CCNC: 15 U/L
BASOPHILS # BLD AUTO: 0.02 K/UL
BASOPHILS NFR BLD: 0.3 %
BILIRUB SERPL-MCNC: 0.2 MG/DL
BUN SERPL-MCNC: 5 MG/DL
CALCIUM SERPL-MCNC: 8.8 MG/DL
CHLORIDE SERPL-SCNC: 104 MMOL/L
CHOLEST/HDLC SERPL: 2.6 {RATIO}
CO2 SERPL-SCNC: 26 MMOL/L
CREAT SERPL-MCNC: 0.8 MG/DL
DIFFERENTIAL METHOD: ABNORMAL
EOSINOPHIL # BLD AUTO: 0.2 K/UL
EOSINOPHIL NFR BLD: 2.2 %
ERYTHROCYTE [DISTWIDTH] IN BLOOD BY AUTOMATED COUNT: 17.1 %
EST. GFR  (AFRICAN AMERICAN): >60 ML/MIN/1.73 M^2
EST. GFR  (NON AFRICAN AMERICAN): >60 ML/MIN/1.73 M^2
GLUCOSE SERPL-MCNC: 83 MG/DL
HCT VFR BLD AUTO: 29 %
HDL/CHOLESTEROL RATIO: 38.8 %
HDLC SERPL-MCNC: 129 MG/DL
HDLC SERPL-MCNC: 50 MG/DL
HGB BLD-MCNC: 9.8 G/DL
LDLC SERPL CALC-MCNC: 67.8 MG/DL
LYMPHOCYTES # BLD AUTO: 3.1 K/UL
LYMPHOCYTES NFR BLD: 43 %
MCH RBC QN AUTO: 30.3 PG
MCHC RBC AUTO-ENTMCNC: 33.8 G/DL
MCV RBC AUTO: 90 FL
MONOCYTES # BLD AUTO: 0.4 K/UL
MONOCYTES NFR BLD: 5.5 %
NEUTROPHILS # BLD AUTO: 3.6 K/UL
NEUTROPHILS NFR BLD: 48.7 %
NONHDLC SERPL-MCNC: 79 MG/DL
PLATELET # BLD AUTO: 343 K/UL
PMV BLD AUTO: 9.8 FL
POTASSIUM SERPL-SCNC: 3.8 MMOL/L
PROT SERPL-MCNC: 6.2 G/DL
RBC # BLD AUTO: 3.23 M/UL
SODIUM SERPL-SCNC: 139 MMOL/L
TRIGL SERPL-MCNC: 56 MG/DL
WBC # BLD AUTO: 7.3 K/UL

## 2017-07-28 PROCEDURE — 36415 COLL VENOUS BLD VENIPUNCTURE: CPT | Mod: PO

## 2017-07-28 PROCEDURE — 80053 COMPREHEN METABOLIC PANEL: CPT

## 2017-07-28 PROCEDURE — 85025 COMPLETE CBC W/AUTO DIFF WBC: CPT

## 2017-07-28 PROCEDURE — 80061 LIPID PANEL: CPT

## 2017-08-09 ENCOUNTER — HOSPITAL ENCOUNTER (EMERGENCY)
Facility: HOSPITAL | Age: 32
Discharge: HOME | End: 2017-08-09
Attending: EMERGENCY MEDICINE
Payer: MEDICAID

## 2017-08-09 VITALS
RESPIRATION RATE: 18 BRPM | SYSTOLIC BLOOD PRESSURE: 119 MMHG | DIASTOLIC BLOOD PRESSURE: 81 MMHG | OXYGEN SATURATION: 99 % | WEIGHT: 118 LBS | BODY MASS INDEX: 20.91 KG/M2 | HEART RATE: 67 BPM | HEIGHT: 63 IN | TEMPERATURE: 98 F

## 2017-08-09 DIAGNOSIS — S41.112A ARM LACERATION, LEFT, INITIAL ENCOUNTER: ICD-10-CM

## 2017-08-09 DIAGNOSIS — S51.012A ELBOW LACERATION, LEFT, INITIAL ENCOUNTER: Primary | ICD-10-CM

## 2017-08-09 LAB
B-HCG UR QL: NEGATIVE
CTP QC/QA: YES

## 2017-08-09 PROCEDURE — 99284 EMERGENCY DEPT VISIT MOD MDM: CPT | Mod: 25

## 2017-08-09 PROCEDURE — 81025 URINE PREGNANCY TEST: CPT | Performed by: NURSE PRACTITIONER

## 2017-08-09 PROCEDURE — 12031 INTMD RPR S/A/T/EXT 2.5 CM/<: CPT

## 2017-08-09 PROCEDURE — 25000003 PHARM REV CODE 250: Performed by: NURSE PRACTITIONER

## 2017-08-09 RX ORDER — LIDOCAINE HYDROCHLORIDE 10 MG/ML
10 INJECTION INFILTRATION; PERINEURAL
Status: COMPLETED | OUTPATIENT
Start: 2017-08-09 | End: 2017-08-09

## 2017-08-09 RX ORDER — HYDROCODONE BITARTRATE AND ACETAMINOPHEN 5; 325 MG/1; MG/1
1 TABLET ORAL
Status: COMPLETED | OUTPATIENT
Start: 2017-08-09 | End: 2017-08-09

## 2017-08-09 RX ADMIN — LIDOCAINE HYDROCHLORIDE 10 ML: 10 INJECTION, SOLUTION INFILTRATION; PERINEURAL at 10:08

## 2017-08-09 RX ADMIN — HYDROCODONE BITARTRATE AND ACETAMINOPHEN 1 TABLET: 5; 325 TABLET ORAL at 10:08

## 2017-08-09 RX ADMIN — BACITRACIN, NEOMYCIN, POLYMYXIN B 1 EACH: 400; 3.5; 5 OINTMENT TOPICAL at 10:08

## 2017-08-10 NOTE — ED PROVIDER NOTES
Encounter Date: 8/9/2017       History     Chief Complaint   Patient presents with    Laceration     L elbow, pt states cut on glass      This is a 32-year-old female with a history of anxiety, bronchitis, asthma, and chronic back pain that comes to the emergency room with a laceration to her left elbow.  Patient reports that she leaned her arm on the floor, not realizing that there was a glass there.  Patient reports that the glass broke and a large piece cut her left elbow. This occured approximately 2 hours ago.  She denies any other injury, swelling, numbness, weakness.  She cannot recall her last tetanus. She attempted treating with hydrogen peroxide and applying pressure. No other reported injuries.      The history is provided by the patient.     Review of patient's allergies indicates:  No Known Allergies  Past Medical History:   Diagnosis Date    Anxiety     Asthma     Bronchitis     Chronic back pain      Past Surgical History:   Procedure Laterality Date    hydrianitis       Family History   Problem Relation Age of Onset    Diabetes Mother     Diabetes Maternal Grandmother     Diabetes Maternal Aunt     Breast cancer Neg Hx     Colon cancer Neg Hx     Ovarian cancer Neg Hx      Social History   Substance Use Topics    Smoking status: Former Smoker     Packs/day: 1.00     Years: 5.00     Types: Cigarettes     Start date: 5/24/2016    Smokeless tobacco: Never Used    Alcohol use No     Review of Systems   Constitutional: Negative for chills and fever.   Musculoskeletal: Positive for arthralgias (left elbow). Negative for joint swelling.   Skin: Positive for wound (laceration left elbow).   Neurological: Negative for weakness and numbness.       Physical Exam     Initial Vitals [08/09/17 1952]   BP Pulse Resp Temp SpO2   107/68 (!) 111 18 100 °F (37.8 °C) 98 %      MAP       81         Physical Exam    Nursing note and vitals reviewed.  Constitutional: Vital signs are normal. She appears  well-developed and well-nourished. She is not diaphoretic. She is active and cooperative. She does not appear ill. No distress.   Musculoskeletal:        Left forearm: She exhibits tenderness and laceration (2.5 cm laceration to the lateral left elbow/upperarm). She exhibits no bony tenderness, no swelling, no edema and no deformity.   Neurological: She is alert and oriented to person, place, and time. She has normal strength. No sensory deficit.   Skin: Skin is warm and dry. Capillary refill takes less than 2 seconds.         ED Course   Lac Repair  Date/Time: 8/9/2017 10:45 PM  Performed by: BRENDA SMITH  Authorized by: COLIN DUNLAP   Body area: upper extremity  Location details: left elbow  Laceration length: 2.5 cm  Foreign bodies: no foreign bodies  Tendon involvement: none  Nerve involvement: none  Vascular damage: no  Anesthesia: local infiltration    Anesthesia:  Local Anesthetic: lidocaine 1% without epinephrine  Anesthetic total: 2 mL  Patient sedated: no  Preparation: Patient was prepped and draped in the usual sterile fashion.  Irrigation solution: saline  Irrigation method: jet lavage  Amount of cleaning: extensive  Debridement: none  Degree of undermining: none  Skin closure: 4-0 Prolene  Number of sutures: 5  Technique: simple  Approximation: close  Approximation difficulty: simple  Dressing: 4x4 sterile gauze and antibiotic ointment  Patient tolerance: Patient tolerated the procedure well with no immediate complications        Labs Reviewed   POCT URINE PREGNANCY          X-Rays:   Independently Interpreted Readings:   Other Readings:  X-ray left elbow with no acute fracture, dislocation, or retained foreign body       Additional MDM:   Comments: This is an urgent evaluation of a 32-year-old female that presents to the emergency room with a left elbow laceration that she sustained today.  Exam reveals a 2.5 cm slightly gaping laceration over the posterior left upper arm/elbow.  There is no tendon  involvement.  Patient has full range of motion.  There is no significant swelling, erythema, warmth, or purulent discharge.  Her x-ray was negative for acute fracture, dislocation, or radiopaque retained foreign body.  The wound was explored, irrigated, and closed using simple interrupted cuticular sutures.  Patient tolerated procedure well.  Tetanus is up-to-date.  Ace wrap placed for reinforcement of the wound.  To follow-up with PCP or return to the emergency room for suture removal in 10-14 days.  Return precautions were given.  Case discussed with attending, , and he is in agreement with plan. Stable for discharge and outpatient follow.  .            Attending Attestation:     Physician Attestation Statement for NP/PA:   I discussed this assessment and plan of this patient with the NP/PA, but I did not personally examine the patient. The face to face encounter was performed by the NP/PA.    Other NP/PA Attestation Additions:      Medical Decision Making: Agree with assessment and plan.                 ED Course     Clinical Impression:   The primary encounter diagnosis was Elbow laceration, left, initial encounter. A diagnosis of Arm laceration, left, initial encounter was also pertinent to this visit.    Disposition:   Disposition: Discharged  Condition: Stable                        Henny Mendes NP  08/10/17 0113       Johnathan Myles MD  08/10/17 0818

## 2017-08-10 NOTE — DISCHARGE INSTRUCTIONS
Please keep the wound clean and dry.  Wash normally with soap and water, taking care not to disrupt the sutures.  Apply a thin layer of antibiotic ointment over the area (bacitracin, neosporin).  You can use a moist cotton Q-tip to push away any crusting that builds up around the sutures.  Keep a clean gauze dressing over the wound and change when soiled, or at least once daily.    Monitor for any signs of infection: redness, swelling, pus or foul discharge, or fever greater than 100.4F.    Take tylenol or ibuprofen for pain, as needed.    Follow up with your primary care doctor or return to the ER in 10-14 days for wound check and suture removal.    Return to the ER for any new or worsening symptoms or concerns.

## 2017-08-22 ENCOUNTER — OFFICE VISIT (OUTPATIENT)
Dept: FAMILY MEDICINE | Facility: CLINIC | Age: 32
End: 2017-08-22
Payer: MEDICAID

## 2017-08-22 VITALS
WEIGHT: 119.5 LBS | OXYGEN SATURATION: 99 % | HEART RATE: 66 BPM | HEIGHT: 63 IN | DIASTOLIC BLOOD PRESSURE: 80 MMHG | BODY MASS INDEX: 21.17 KG/M2 | SYSTOLIC BLOOD PRESSURE: 118 MMHG | RESPIRATION RATE: 16 BRPM | TEMPERATURE: 99 F

## 2017-08-22 DIAGNOSIS — Z48.02 VISIT FOR SUTURE REMOVAL: Primary | ICD-10-CM

## 2017-08-22 PROCEDURE — 99024 POSTOP FOLLOW-UP VISIT: CPT | Mod: ,,, | Performed by: PHYSICIAN ASSISTANT

## 2017-08-22 PROCEDURE — 3008F BODY MASS INDEX DOCD: CPT | Mod: ,,, | Performed by: PHYSICIAN ASSISTANT

## 2017-08-22 PROCEDURE — 99213 OFFICE O/P EST LOW 20 MIN: CPT | Mod: PBBFAC,PO | Performed by: PHYSICIAN ASSISTANT

## 2017-08-22 PROCEDURE — 99999 PR PBB SHADOW E&M-EST. PATIENT-LVL III: CPT | Mod: PBBFAC,,, | Performed by: PHYSICIAN ASSISTANT

## 2017-08-22 PROCEDURE — 99213 OFFICE O/P EST LOW 20 MIN: CPT | Mod: S$PBB,,, | Performed by: PHYSICIAN ASSISTANT

## 2017-08-22 NOTE — PROGRESS NOTES
Subjective:       Patient ID: Marlene Munoz is a 32 y.o. female.    Chief Complaint: Suture / Staple Removal (left elbow)    HPI: 33 yo female presents for suture removal. Went to Ed on 8/9 after cutting left elbow on a mug. Had 5 stiches placed. States one broke last night and she pulled it out. She denies drainage.    Review of Systems   Constitutional: Negative for fever.   Skin: Positive for wound.       Objective:      Physical Exam   Constitutional: She appears well-developed and well-nourished. No distress.   HENT:   Head: Normocephalic and atraumatic.   Skin: Skin is warm and dry. She is not diaphoretic.        Psychiatric: She has a normal mood and affect. Her behavior is normal.   Vitals reviewed.      Assessment:       1. Visit for suture removal        Plan:         Marlene DIALLO was seen today for suture / staple removal.    Diagnoses and all orders for this visit:    Visit for suture removal  4 sutures removed, pt tolerated well, wound healing well, advised neosporin and covering

## 2017-08-23 DIAGNOSIS — M54.50 CHRONIC BILATERAL LOW BACK PAIN WITHOUT SCIATICA: ICD-10-CM

## 2017-08-23 DIAGNOSIS — F41.1 GENERALIZED ANXIETY DISORDER: ICD-10-CM

## 2017-08-23 DIAGNOSIS — G89.29 CHRONIC BILATERAL LOW BACK PAIN WITHOUT SCIATICA: ICD-10-CM

## 2017-08-23 NOTE — TELEPHONE ENCOUNTER
----- Message from Gwendolyn Bosch sent at 8/23/2017  2:34 PM CDT -----  Contact: self  Patient is calling to schedule her 3 month f/u for refills of -- tramadol (ULTRAM) 50 mg tablet & clonazePAM (KLONOPIN) 2 MG Tab . No available appts until 9-18-17 . She states she can not wait until then .     [phone]   LL

## 2017-08-23 NOTE — TELEPHONE ENCOUNTER
Patient requesting refill and an office visit sooner than 9/18/2017.  Please advise.     LOV  8/22/2017   Armani            7/27/2017 Lombard

## 2017-08-25 ENCOUNTER — TELEPHONE (OUTPATIENT)
Dept: FAMILY MEDICINE | Facility: CLINIC | Age: 32
End: 2017-08-25

## 2017-08-25 NOTE — TELEPHONE ENCOUNTER
Left message for patient to call office to schedule office visit.     Per Dr. Lombard, patient will be granted a 1 month refill on medications.  Must come in for office visit to receive any further refills.

## 2017-08-25 NOTE — TELEPHONE ENCOUNTER
----- Message from Ivanna Gonzalez sent at 8/25/2017  9:22 AM CDT -----  Contact: self  Pt is requesting an earlier appt than 09/20. Please advise. [phone] 09/20 was offered & the pt refused it.

## 2017-08-29 ENCOUNTER — TELEPHONE (OUTPATIENT)
Dept: FAMILY MEDICINE | Facility: CLINIC | Age: 32
End: 2017-08-29

## 2017-08-29 NOTE — TELEPHONE ENCOUNTER
----- Message from Emilia Varela sent at 8/29/2017 11:18 AM CDT -----  Contact: Self   Patient called to check on her medication refill request. Please call at  [phone].

## 2017-08-29 NOTE — TELEPHONE ENCOUNTER
----- Message from Anita Varela sent at 8/29/2017 11:57 AM CDT -----  Patient states she scheduled her office visit. She would like to know when meds will be approved. Please call at [phone] Thank you!

## 2017-08-29 NOTE — TELEPHONE ENCOUNTER
Ulices for patient to call. Detailed info in previous encounter from Rebekah. Patient can schedule OV per Dr.Lombard, must keep scheduled appt and 1 month of refills will be approved

## 2017-08-31 ENCOUNTER — TELEPHONE (OUTPATIENT)
Dept: FAMILY MEDICINE | Facility: CLINIC | Age: 32
End: 2017-08-31

## 2017-08-31 NOTE — TELEPHONE ENCOUNTER
----- Message from Enedina Anaya sent at 8/31/2017  8:46 AM CDT -----  Contact: self  Pt calling to get an update on refill of tramadol. Please call pt to [phone].

## 2017-09-01 DIAGNOSIS — F41.1 GENERALIZED ANXIETY DISORDER: ICD-10-CM

## 2017-09-01 DIAGNOSIS — G89.29 CHRONIC BILATERAL LOW BACK PAIN WITHOUT SCIATICA: ICD-10-CM

## 2017-09-01 DIAGNOSIS — M54.50 CHRONIC BILATERAL LOW BACK PAIN WITHOUT SCIATICA: ICD-10-CM

## 2017-09-01 RX ORDER — TRAMADOL HYDROCHLORIDE 50 MG/1
50 TABLET ORAL EVERY 8 HOURS PRN
Qty: 90 TABLET | Refills: 0 | Status: SHIPPED | OUTPATIENT
Start: 2017-09-01 | End: 2017-09-22 | Stop reason: SDUPTHER

## 2017-09-01 RX ORDER — TRAMADOL HYDROCHLORIDE 50 MG/1
50 TABLET ORAL EVERY 8 HOURS PRN
Qty: 90 TABLET | Refills: 0 | Status: CANCELLED | OUTPATIENT
Start: 2017-09-01

## 2017-09-01 RX ORDER — CLONAZEPAM 2 MG/1
2 TABLET ORAL 2 TIMES DAILY PRN
Qty: 60 TABLET | Refills: 0 | Status: CANCELLED | OUTPATIENT
Start: 2017-09-01 | End: 2018-09-01

## 2017-09-01 RX ORDER — CLONAZEPAM 2 MG/1
2 TABLET ORAL 2 TIMES DAILY PRN
Qty: 60 TABLET | Refills: 0 | Status: SHIPPED | OUTPATIENT
Start: 2017-09-01 | End: 2017-09-25 | Stop reason: SDUPTHER

## 2017-09-01 NOTE — TELEPHONE ENCOUNTER
----- Message from Emilia Varela sent at 9/1/2017  8:30 AM CDT -----  Contact: Self   Patient call to check on status of script . Please call at  [phone]

## 2017-09-01 NOTE — TELEPHONE ENCOUNTER
----- Message from Gwendolyn Bosch sent at 9/1/2017  9:01 AM CDT -----  Contact: SELF  Patient states she just missed a call from someone.    [phone]   LL

## 2017-09-01 NOTE — TELEPHONE ENCOUNTER
Called to inform patient of refill approvals - clonazepam, tramadol.  Patient requesting to have refills sent to [facility].  Please advise.

## 2017-09-22 ENCOUNTER — OFFICE VISIT (OUTPATIENT)
Dept: FAMILY MEDICINE | Facility: CLINIC | Age: 32
End: 2017-09-22
Payer: MEDICAID

## 2017-09-22 VITALS
HEART RATE: 100 BPM | HEIGHT: 63 IN | WEIGHT: 118.38 LBS | TEMPERATURE: 98 F | SYSTOLIC BLOOD PRESSURE: 132 MMHG | RESPIRATION RATE: 14 BRPM | OXYGEN SATURATION: 98 % | BODY MASS INDEX: 20.98 KG/M2 | DIASTOLIC BLOOD PRESSURE: 80 MMHG

## 2017-09-22 DIAGNOSIS — M54.50 CHRONIC BILATERAL LOW BACK PAIN WITHOUT SCIATICA: Primary | ICD-10-CM

## 2017-09-22 DIAGNOSIS — J02.8 ACUTE BACTERIAL PHARYNGITIS: ICD-10-CM

## 2017-09-22 DIAGNOSIS — G89.29 CHRONIC BILATERAL LOW BACK PAIN WITHOUT SCIATICA: Primary | ICD-10-CM

## 2017-09-22 DIAGNOSIS — B96.89 ACUTE BACTERIAL PHARYNGITIS: ICD-10-CM

## 2017-09-22 PROCEDURE — 96372 THER/PROPH/DIAG INJ SC/IM: CPT | Mod: PBBFAC,PO

## 2017-09-22 PROCEDURE — 3008F BODY MASS INDEX DOCD: CPT | Mod: ,,, | Performed by: FAMILY MEDICINE

## 2017-09-22 PROCEDURE — 99999 PR PBB SHADOW E&M-EST. PATIENT-LVL III: CPT | Mod: PBBFAC,,, | Performed by: FAMILY MEDICINE

## 2017-09-22 PROCEDURE — 99215 OFFICE O/P EST HI 40 MIN: CPT | Mod: S$PBB,,, | Performed by: FAMILY MEDICINE

## 2017-09-22 PROCEDURE — 99213 OFFICE O/P EST LOW 20 MIN: CPT | Mod: PBBFAC,PO | Performed by: FAMILY MEDICINE

## 2017-09-22 RX ORDER — CEFTRIAXONE 1 G/1
1 INJECTION, POWDER, FOR SOLUTION INTRAMUSCULAR; INTRAVENOUS
Status: COMPLETED | OUTPATIENT
Start: 2017-09-22 | End: 2017-09-22

## 2017-09-22 RX ORDER — TRAMADOL HYDROCHLORIDE 50 MG/1
50 TABLET ORAL EVERY 8 HOURS PRN
Qty: 90 TABLET | Refills: 2 | Status: SHIPPED | OUTPATIENT
Start: 2017-09-29 | End: 2017-12-15 | Stop reason: SDUPTHER

## 2017-09-22 RX ADMIN — CEFTRIAXONE SODIUM 1 G: 500 INJECTION, POWDER, FOR SOLUTION INTRAMUSCULAR; INTRAVENOUS at 08:09

## 2017-09-22 NOTE — PROGRESS NOTES
Administered Ceftriaxone 1 g IM to right ventrogluteal.  Patient tolerated injection well, no adverse reactions noted.

## 2017-09-22 NOTE — PROGRESS NOTES
Chief Complaint   Patient presents with    Sore Throat     stayes tonsill (has been taking amoxillcillian  500 mg) swollen and needs refill       Marlene Munoz is a 32 y.o. female who presents per the Chief Complaint.  Pt is known to me and was last seen by me on 7/27/2017.  All known chronic medical issues have been documented.       Sore Throat    This is a new problem. The current episode started in the past 7 days. The problem has been gradually worsening. Neither side of throat is experiencing more pain than the other. The maximum temperature recorded prior to her arrival was 100.4 - 100.9 F. The fever has been present for 1 to 2 days. The pain is at a severity of 4/10. The pain is moderate. Associated symptoms include ear pain, swollen glands and trouble swallowing. Pertinent negatives include no abdominal pain, congestion, coughing, diarrhea, drooling, ear discharge, headaches, hoarse voice, plugged ear sensation, neck pain, shortness of breath, stridor or vomiting. She has had no exposure to strep or mono. Treatments tried: antibiotics. The treatment provided mild relief.        ROS  Review of Systems   Constitutional: Positive for chills. Negative for activity change, appetite change, diaphoresis, fatigue, fever and unexpected weight change.   HENT: Positive for ear pain, postnasal drip, rhinorrhea, sinus pressure, sore throat and trouble swallowing. Negative for congestion, dental problem, drooling, ear discharge, facial swelling, hearing loss, hoarse voice, mouth sores, nosebleeds, sneezing, tinnitus and voice change.    Eyes: Negative for pain, discharge and visual disturbance.   Respiratory: Negative for cough, choking, shortness of breath and stridor.    Cardiovascular: Positive for palpitations. Negative for chest pain and leg swelling.   Gastrointestinal: Negative for abdominal pain, constipation, diarrhea, nausea and vomiting.   Genitourinary: Negative for difficulty urinating, dysuria, flank  "pain, frequency and urgency.   Musculoskeletal: Positive for back pain. Negative for arthralgias, gait problem, joint swelling, myalgias and neck pain.   Skin: Negative.    Allergic/Immunologic: Positive for environmental allergies. Negative for food allergies and immunocompromised state.   Neurological: Negative.  Negative for dizziness, seizures, syncope, weakness, light-headedness, numbness and headaches.   Psychiatric/Behavioral: Negative.  Negative for confusion, decreased concentration, dysphoric mood and sleep disturbance. The patient is not nervous/anxious.        Physical Exam  Vitals:    09/22/17 0720   BP: 132/80   Pulse: 100   Resp: 14   Temp: 98.4 °F (36.9 °C)    Body mass index is 20.97 kg/m².  Weight: 53.7 kg (118 lb 6.2 oz)   Height: 5' 3" (160 cm)     Physical Exam   Constitutional: She is oriented to person, place, and time. She appears well-developed and well-nourished. She is active and cooperative.  Non-toxic appearance. She does not have a sickly appearance. She does not appear ill. No distress.   Weight gain noted   HENT:   Head: Normocephalic and atraumatic.   Right Ear: Hearing, tympanic membrane, external ear and ear canal normal. No lacerations. There is tenderness. No drainage or swelling. No foreign bodies. No mastoid tenderness. Tympanic membrane is not injected, not scarred, not perforated, not erythematous, not retracted and not bulging. Tympanic membrane mobility is normal. No middle ear effusion. No hemotympanum. No decreased hearing is noted.   Left Ear: Hearing, tympanic membrane, external ear and ear canal normal. No lacerations. There is tenderness. No drainage or swelling. No foreign bodies. No mastoid tenderness. Tympanic membrane is not injected, not scarred, not perforated, not erythematous, not retracted and not bulging. Tympanic membrane mobility is normal.  No middle ear effusion. No hemotympanum. No decreased hearing is noted.   Nose: Nose normal. No rhinorrhea or nasal " deformity.   Mouth/Throat: Uvula is midline and mucous membranes are normal. She does not have dentures. No oral lesions. No trismus in the jaw. Normal dentition. Dental caries present. No dental abscesses, uvula swelling or lacerations. Oropharyngeal exudate, posterior oropharyngeal edema, posterior oropharyngeal erythema and tonsillar abscesses present. Tonsils are 3+ on the right. Tonsils are 2+ on the left. Tonsillar exudate.   Eyes: Conjunctivae, EOM and lids are normal. Pupils are equal, round, and reactive to light. Right eye exhibits no chemosis, no discharge and no exudate. No foreign body present in the right eye. Left eye exhibits no chemosis, no discharge and no exudate. No foreign body present in the left eye. No scleral icterus.   Neck: Normal range of motion and full passive range of motion without pain. Neck supple.   Cardiovascular: Normal rate, regular rhythm, S1 normal, S2 normal and normal heart sounds.  Exam reveals no gallop and no friction rub.    No murmur heard.  Pulmonary/Chest: Effort normal and breath sounds normal. No accessory muscle usage. No respiratory distress. She has no decreased breath sounds. She has no wheezes. She has no rhonchi. She has no rales.   Abdominal: Soft. Normal appearance. She exhibits no distension. There is no hepatosplenomegaly. There is no tenderness. There is no rigidity, no rebound and no guarding.   Musculoskeletal: Normal range of motion.   Neurological: She is alert and oriented to person, place, and time. She has normal strength. No cranial nerve deficit or sensory deficit. She exhibits normal muscle tone. She displays no seizure activity. Coordination and gait normal.   Skin: Skin is warm, dry and intact. No rash noted. She is not diaphoretic.   Psychiatric: She has a normal mood and affect. Her speech is normal and behavior is normal. Judgment and thought content normal. Cognition and memory are normal. She is attentive.       Assessment & Plan    1.  Chronic bilateral low back pain without sciatica  The current medical regimen is effective at this time and no changes to present plan or medications will be made at this visit.   Advised that medication will be discontinued after this last refill.  Advised that she would need imaging to further evaluate.  - tramadol (ULTRAM) 50 mg tablet; Take 1 tablet (50 mg total) by mouth every 8 (eight) hours as needed.  Dispense: 90 tablet; Refill: 2    2. Acute bacterial pharyngitis  Patient taken oral older antibiotics; not effective after 4 days.  IM antibiotics given in office.  Patient advised to avoid hot beverages while throat is inflamed or sore and is encouraged to drink room temperature to cold beverages and avoid hot foods as well.  If throat continues to be sore after other symptoms resolve, follow up for further evaluation.    - cefTRIAXone injection 1 g; Inject 1 g into the muscle one time.      Follow up documented    ACTIVE MEDICAL ISSUES:  Documented in Problem List    PAST MEDICAL HISTORY  Documented    PAST SURGICAL HISTORY:  Documented    SOCIAL HISTORY:  Documented    FAMILY HISTORY:  Documented    ALLERGIES AND MEDICATIONS: updated and reviewed.  Documented    Health Maintenance       Date Due Completion Date    TETANUS VACCINE 02/17/2005 ---    Influenza Vaccine 08/01/2017 3/23/2017 (Declined)    Override on 3/23/2017: Declined    Override on 12/1/2015: Done (today)    Pap Smear 03/23/2018 3/23/2017    Override on 12/1/2015: Done (had one done last months 11/2015 [facility])

## 2017-09-25 DIAGNOSIS — F41.1 GENERALIZED ANXIETY DISORDER: ICD-10-CM

## 2017-09-25 RX ORDER — CLONAZEPAM 2 MG/1
2 TABLET ORAL 2 TIMES DAILY PRN
Qty: 60 TABLET | Refills: 0 | Status: SHIPPED | OUTPATIENT
Start: 2017-09-25 | End: 2017-10-18 | Stop reason: SDUPTHER

## 2017-09-25 NOTE — TELEPHONE ENCOUNTER
----- Message from Anabella Ingram sent at 9/25/2017  9:52 AM CDT -----  Contact: SELF  REFILL: clonazePAM (KLONOPIN) 2 MG Tab

## 2017-10-18 DIAGNOSIS — F41.1 GENERALIZED ANXIETY DISORDER: ICD-10-CM

## 2017-10-18 NOTE — TELEPHONE ENCOUNTER
----- Message from Henry Moran sent at 10/18/2017  2:21 PM CDT -----  Contact: Self/[phone]  Refill:  clonazePAM (KLONOPIN) 2 MG Tab    Thank you.

## 2017-10-19 RX ORDER — CLONAZEPAM 2 MG/1
2 TABLET ORAL 2 TIMES DAILY PRN
Qty: 60 TABLET | Refills: 1 | Status: SHIPPED | OUTPATIENT
Start: 2017-10-19 | End: 2017-12-15 | Stop reason: SDUPTHER

## 2017-11-14 DIAGNOSIS — R63.4 UNINTENDED WEIGHT LOSS: ICD-10-CM

## 2017-11-14 NOTE — TELEPHONE ENCOUNTER
----- Message from Anahi Aparicio sent at 11/14/2017  9:26 AM CST -----  Contact: [phone]  Refill:cyproheptadine (,PERIACTIN,) 2 mg/5 mL syrup please send to [place], [facility] [address] [place] [address] [facility]

## 2017-11-15 RX ORDER — CYPROHEPTADINE HYDROCHLORIDE 2 MG/5ML
4 SOLUTION ORAL
Qty: 300 ML | Refills: 2 | Status: SHIPPED | OUTPATIENT
Start: 2017-11-15 | End: 2018-01-12

## 2017-12-12 DIAGNOSIS — F41.1 GENERALIZED ANXIETY DISORDER: ICD-10-CM

## 2017-12-12 DIAGNOSIS — M54.50 CHRONIC BILATERAL LOW BACK PAIN WITHOUT SCIATICA: ICD-10-CM

## 2017-12-12 DIAGNOSIS — G89.29 CHRONIC BILATERAL LOW BACK PAIN WITHOUT SCIATICA: ICD-10-CM

## 2017-12-12 NOTE — TELEPHONE ENCOUNTER
----- Message from Anabella Ingram sent at 12/12/2017  2:22 PM CST -----  Contact: SELF  REFILL: TRAMADOL                 KLONOPIN 2MG    PLEASE CONTACT PATIENT -[phone]

## 2017-12-12 NOTE — TELEPHONE ENCOUNTER
LOV 9/22/2017  Last script Tramadol 9/29/2017 with 3 refills  [place] script Klonopin 10/19/2017 with 1 refill

## 2017-12-14 ENCOUNTER — TELEPHONE (OUTPATIENT)
Dept: FAMILY MEDICINE | Facility: CLINIC | Age: 32
End: 2017-12-14

## 2017-12-14 RX ORDER — TRAMADOL HYDROCHLORIDE 50 MG/1
50 TABLET ORAL EVERY 8 HOURS PRN
Qty: 90 TABLET | Refills: 2 | OUTPATIENT
Start: 2017-12-14 | End: 2018-03-14

## 2017-12-14 RX ORDER — CLONAZEPAM 2 MG/1
2 TABLET ORAL 2 TIMES DAILY PRN
Qty: 60 TABLET | Refills: 1 | OUTPATIENT
Start: 2017-12-14 | End: 2018-12-14

## 2017-12-14 NOTE — TELEPHONE ENCOUNTER
----- Message from Enedina Anaya sent at 12/14/2017  1:08 PM CST -----  Contact: self  Pt calling to find out why her medication was denied. Please call [phone].

## 2017-12-14 NOTE — TELEPHONE ENCOUNTER
Returned call to patient, lm for patient noting that an office visit is needed per Dr.Lombard for any refills on requested medication.

## 2017-12-15 ENCOUNTER — OFFICE VISIT (OUTPATIENT)
Dept: FAMILY MEDICINE | Facility: CLINIC | Age: 32
End: 2017-12-15
Payer: MEDICAID

## 2017-12-15 VITALS
RESPIRATION RATE: 16 BRPM | WEIGHT: 114.44 LBS | SYSTOLIC BLOOD PRESSURE: 100 MMHG | BODY MASS INDEX: 20.28 KG/M2 | TEMPERATURE: 99 F | OXYGEN SATURATION: 99 % | HEIGHT: 63 IN | HEART RATE: 74 BPM | DIASTOLIC BLOOD PRESSURE: 62 MMHG

## 2017-12-15 DIAGNOSIS — J03.91 RECURRENT TONSILLITIS: ICD-10-CM

## 2017-12-15 DIAGNOSIS — M54.50 CHRONIC BILATERAL LOW BACK PAIN WITHOUT SCIATICA: Primary | ICD-10-CM

## 2017-12-15 DIAGNOSIS — F41.1 GENERALIZED ANXIETY DISORDER: ICD-10-CM

## 2017-12-15 DIAGNOSIS — N83.201 CYST OF RIGHT OVARY: ICD-10-CM

## 2017-12-15 DIAGNOSIS — G89.29 CHRONIC BILATERAL LOW BACK PAIN WITHOUT SCIATICA: Primary | ICD-10-CM

## 2017-12-15 PROCEDURE — 99999 PR PBB SHADOW E&M-EST. PATIENT-LVL IV: CPT | Mod: PBBFAC,,, | Performed by: FAMILY MEDICINE

## 2017-12-15 PROCEDURE — 99215 OFFICE O/P EST HI 40 MIN: CPT | Mod: S$PBB,,, | Performed by: FAMILY MEDICINE

## 2017-12-15 PROCEDURE — 99214 OFFICE O/P EST MOD 30 MIN: CPT | Mod: PBBFAC,PO | Performed by: FAMILY MEDICINE

## 2017-12-15 RX ORDER — CLONAZEPAM 2 MG/1
2 TABLET ORAL 2 TIMES DAILY PRN
Qty: 60 TABLET | Refills: 2 | Status: SHIPPED | OUTPATIENT
Start: 2017-12-15 | End: 2018-01-12

## 2017-12-15 RX ORDER — TRAMADOL HYDROCHLORIDE 50 MG/1
50 TABLET ORAL EVERY 8 HOURS PRN
Qty: 90 TABLET | Refills: 0 | Status: SHIPPED | OUTPATIENT
Start: 2017-12-15 | End: 2018-01-12

## 2017-12-15 NOTE — PROGRESS NOTES
Chief Complaint   Patient presents with    Back Pain     refill on medication       Marlene Munoz is a 32 y.o. female who presents per the Chief Complaint.  Pt is known to me and was last seen by me on 9/22/2017.  All known chronic medical issues have been documented.       Back Pain   This is a chronic problem. The current episode started more than 1 year ago. The problem occurs daily. The problem is unchanged. The pain is present in the lumbar spine. The quality of the pain is described as aching. The pain does not radiate. The pain is at a severity of 5/10. The pain is moderate. The pain is worse during the night. The symptoms are aggravated by sitting and twisting. Associated symptoms include pelvic pain (right sided). Pertinent negatives include no abdominal pain, bladder incontinence, bowel incontinence, chest pain, dysuria, fever, headaches, leg pain, numbness, paresis, paresthesias, perianal numbness, tingling, weakness or weight loss. She has tried analgesics for the symptoms. The treatment provided moderate relief.   Anxiety   Presents for follow-up visit. Onset was at an unknown time. The problem has been unchanged. Symptoms include excessive worry, irritability, nervous/anxious behavior and palpitations. Patient reports no chest pain, compulsions, confusion, decreased concentration, depressed mood, dizziness, dry mouth, feeling of choking, hyperventilation, insomnia, malaise, muscle tension, nausea, obsessions, panic, restlessness, shortness of breath or suicidal ideas. Symptoms occur most days. The severity of symptoms is severe. The symptoms are aggravated by family issues. The quality of sleep is fair. Nighttime awakenings: occasional.     There are no known risk factors. Her past medical history is significant for anxiety/panic attacks. There is no history of anemia, arrhythmia, bipolar disorder, CAD, CHF, chronic lung disease, depression, fibromyalgia or hyperthyroidism. Past treatments  "include benzodiazephines. The treatment provided moderate relief. Compliance with prior treatments has been variable. Side effects of treatment include auditory problems, GI discomfort, headaches, joint pain, a rash, urinary problems, visual problems and limb pain.        ROS  Review of Systems   Constitutional: Positive for irritability and unexpected weight change. Negative for activity change, appetite change, chills, diaphoresis, fatigue, fever and weight loss.   HENT: Positive for trouble swallowing. Negative for congestion, ear pain, hearing loss, nosebleeds, postnasal drip, rhinorrhea, sinus pressure, sneezing and sore throat.    Eyes: Negative for pain and visual disturbance.   Respiratory: Negative for cough, choking and shortness of breath.    Cardiovascular: Positive for palpitations. Negative for chest pain and leg swelling.   Gastrointestinal: Negative for abdominal pain, bowel incontinence, constipation, diarrhea, nausea and vomiting.   Genitourinary: Positive for pelvic pain (right sided). Negative for bladder incontinence, difficulty urinating, dysuria, frequency and urgency.   Musculoskeletal: Positive for back pain. Negative for arthralgias, gait problem, joint swelling and myalgias.   Skin: Negative.    Allergic/Immunologic: Negative for environmental allergies and food allergies.   Neurological: Negative.  Negative for dizziness, tingling, seizures, syncope, weakness, light-headedness, numbness, headaches and paresthesias.   Psychiatric/Behavioral: Positive for agitation, dysphoric mood and sleep disturbance. Negative for behavioral problems, confusion, decreased concentration, hallucinations and suicidal ideas. The patient is nervous/anxious. The patient does not have insomnia and is not hyperactive.        Physical Exam  Vitals:    12/15/17 0748   BP: 100/62   Pulse: 74   Resp: 16   Temp: 98.8 °F (37.1 °C)    Body mass index is 20.27 kg/m².  Weight: 51.9 kg (114 lb 6.7 oz)   Height: 5' 3" (160 " cm)     Physical Exam   Constitutional: She is oriented to person, place, and time. She appears well-developed and well-nourished. She is active and cooperative.  Non-toxic appearance. She does not have a sickly appearance. She does not appear ill. No distress.   HENT:   Head: Normocephalic and atraumatic.   Right Ear: Hearing and external ear normal. No decreased hearing is noted.   Left Ear: Hearing and external ear normal. No decreased hearing is noted.   Nose: Nose normal. No rhinorrhea or nasal deformity.   Mouth/Throat: Uvula is midline, oropharynx is clear and moist and mucous membranes are normal. She does not have dentures. Normal dentition. Tonsils are 3+ on the right. Tonsils are 2+ on the left. No tonsillar exudate.   Eyes: Conjunctivae, EOM and lids are normal. Pupils are equal, round, and reactive to light. Right eye exhibits no chemosis, no discharge and no exudate. No foreign body present in the right eye. Left eye exhibits no chemosis, no discharge and no exudate. No foreign body present in the left eye. No scleral icterus.   Neck: Normal range of motion and full passive range of motion without pain. Neck supple.   Cardiovascular: Normal rate, regular rhythm, S1 normal, S2 normal and normal heart sounds.  Exam reveals no gallop and no friction rub.    No murmur heard.  Pulmonary/Chest: Effort normal and breath sounds normal. No accessory muscle usage. No respiratory distress. She has no decreased breath sounds. She has no wheezes. She has no rhonchi. She has no rales.   Abdominal: Soft. Normal appearance. She exhibits no distension. There is no hepatosplenomegaly. There is no tenderness. There is no rigidity, no rebound and no guarding.       Musculoskeletal: Normal range of motion.        Thoracic back: She exhibits tenderness, bony tenderness and pain. She exhibits normal range of motion, no swelling, no edema, no deformity, no laceration, no spasm and normal pulse.        Lumbar back: She  exhibits tenderness, bony tenderness and pain. She exhibits normal range of motion, no swelling, no edema, no deformity, no laceration, no spasm and normal pulse.   Neurological: She is alert and oriented to person, place, and time. She has normal strength. No cranial nerve deficit or sensory deficit. She exhibits normal muscle tone. She displays no seizure activity. Coordination and gait normal.   Skin: Skin is warm, dry and intact. No rash noted. She is not diaphoretic.   Psychiatric: She has a normal mood and affect. Her speech is normal and behavior is normal. Judgment and thought content normal. Cognition and memory are normal. She is attentive.       Assessment & Plan    1. Chronic bilateral low back pain without sciatica  Will fill last refill and discussed finding an appropriate alternative therapy to address persistent back pain, including PT or pain management.  - traMADol (ULTRAM) 50 mg tablet; Take 1 tablet (50 mg total) by mouth every 8 (eight) hours as needed.  Dispense: 90 tablet; Refill: 0    2. Generalized anxiety disorder  Will continue BZD therapy and discontinue pain medication.  Patient has been seen by mental health, but due to insurance limitations, does not continue appointments.  - clonazePAM (KLONOPIN) 2 MG Tab; Take 1 tablet (2 mg total) by mouth 2 (two) times daily as needed.  Dispense: 60 tablet; Refill: 2    3. Cyst of right ovary  Referral to appropriate specialist for evaluation and management placed in [facility].   - Ambulatory referral to Obstetrics / Gynecology    4. Recurrent tonsillitis  Referral to appropriate specialist for evaluation and management placed in [facility].   - Ambulatory referral to ENT      Follow up documented    ACTIVE MEDICAL ISSUES:  Documented in Problem List    PAST MEDICAL HISTORY  Documented    PAST SURGICAL HISTORY:  Documented    SOCIAL HISTORY:  Documented    FAMILY HISTORY:  Documented    ALLERGIES AND MEDICATIONS: updated and reviewed.  Documented    Health  Maintenance       Date Due Completion Date    TETANUS VACCINE 02/17/2005 ---    Influenza Vaccine 08/01/2017 3/23/2017 (Declined)    Override on 3/23/2017: Declined    Override on 12/1/2015: Done (today)    Pap Smear 03/23/2018 3/23/2017    Override on 12/1/2015: Done ([facility]

## 2018-01-12 ENCOUNTER — HOSPITAL ENCOUNTER (EMERGENCY)
Facility: HOSPITAL | Age: 33
Discharge: HOME | End: 2018-01-12
Attending: EMERGENCY MEDICINE
Payer: MEDICAID

## 2018-01-12 VITALS
DIASTOLIC BLOOD PRESSURE: 79 MMHG | WEIGHT: 115 LBS | SYSTOLIC BLOOD PRESSURE: 126 MMHG | HEIGHT: 63 IN | BODY MASS INDEX: 20.38 KG/M2 | RESPIRATION RATE: 16 BRPM | OXYGEN SATURATION: 100 % | HEART RATE: 95 BPM | TEMPERATURE: 98 F

## 2018-01-12 DIAGNOSIS — M25.532 LEFT WRIST PAIN: ICD-10-CM
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
## 2018-01-12 DIAGNOSIS — S01.81XA FOREHEAD LACERATION, INITIAL ENCOUNTER: ICD-10-CM

## 2018-01-12 PROCEDURE — 12011 RPR F/E/E/N/L/M 2.5 CM/<: CPT

## 2018-01-12 PROCEDURE — 90715 TDAP VACCINE 7 YRS/> IM: CPT | Performed by: NURSE PRACTITIONER

## 2018-01-12 PROCEDURE — 90471 IMMUNIZATION ADMIN: CPT | Performed by: NURSE PRACTITIONER

## 2018-01-12 PROCEDURE — 63600175 PHARM REV CODE 636 W HCPCS: Performed by: NURSE PRACTITIONER

## 2018-01-12 PROCEDURE — 25000003 PHARM REV CODE 250: Performed by: NURSE PRACTITIONER

## 2018-01-12 PROCEDURE — 99284 EMERGENCY DEPT VISIT MOD MDM: CPT

## 2018-01-12 RX ORDER — LIDOCAINE HYDROCHLORIDE 10 MG/ML
10 INJECTION INFILTRATION; PERINEURAL
Status: DISCONTINUED | OUTPATIENT
Start: 2018-01-12 | End: 2018-01-12 | Stop reason: HOSPADM

## 2018-01-12 RX ORDER — SULINDAC 150 MG/1
150 TABLET ORAL 2 TIMES DAILY
Qty: 10 TABLET | Refills: 0 | Status: SHIPPED | OUTPATIENT
Start: 2018-01-12 | End: 2018-01-19 | Stop reason: ALTCHOICE

## 2018-01-12 RX ADMIN — BACITRACIN, NEOMYCIN, POLYMYXIN B 1 EACH: 400; 3.5; 5 OINTMENT TOPICAL at 06:01

## 2018-01-12 RX ADMIN — CLOSTRIDIUM TETANI TOXOID ANTIGEN (FORMALDEHYDE INACTIVATED), CORYNEBACTERIUM DIPHTHERIAE TOXOID ANTIGEN (FORMALDEHYDE INACTIVATED), BORDETELLA PERTUSSIS TOXOID ANTIGEN (GLUTARALDEHYDE INACTIVATED), BORDETELLA PERTUSSIS FILAMENTOUS HEMAGGLUTININ ANTIGEN (FORMALDEHYDE INACTIVATED), BORDETELLA PERTUSSIS PERTACTIN ANTIGEN, AND BORDETELLA PERTUSSIS FIMBRIAE 2/3 ANTIGEN 0.5 ML: 5; 2; 2.5; 5; 3; 5 INJECTION, SUSPENSION INTRAMUSCULAR at 06:01
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
## 2018-01-12 NOTE — DISCHARGE INSTRUCTIONS
Please keep your wound clean and dry.  Wash gently with soap and water and apply antibiotic ointment (bacitracin, neosporin, etc.) over the wound after washing. Please watch for signs of infection including: increased\spreading redness, swelling, pus-like discharge, or a fever greater than 100.4F. If you experience any of these, please contact your Primary Care Doctor or Return to the Emergency Department for a wound check.     Please follow up with your Primary Care Doctor in 7 days for wound recheck and suture removal.  You may return to the Emergency Department if you are unable to see your Primary Care Doctor.  Please return to the ER for any new or worsening symptoms.
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
## 2018-01-16 ENCOUNTER — TELEPHONE (OUTPATIENT)
Dept: FAMILY MEDICINE | Facility: CLINIC | Age: 33
End: 2018-01-16

## 2018-01-16 DIAGNOSIS — S01.01XS SCALP LACERATION, SEQUELA: Primary | ICD-10-CM

## 2018-01-16 RX ORDER — TRAMADOL HYDROCHLORIDE 50 MG/1
50 TABLET ORAL EVERY 6 HOURS PRN
Qty: 20 TABLET | Refills: 0 | Status: SHIPPED | OUTPATIENT
Start: 2018-01-16 | End: 2018-01-19 | Stop reason: SDUPTHER

## 2018-01-16 NOTE — TELEPHONE ENCOUNTER
----- Message from Cydney Liu sent at 1/16/2018  9:12 AM CST -----  Contact: Pt  PT called to speak to the nurse to discuss her care and to request a work in appt today with the provider to to experiencing head pain due to having stitches in her head. Pt would like a call back this morning asap.    Pt can be reached at [phone].    Thanks

## 2018-01-16 NOTE — TELEPHONE ENCOUNTER
----- Message from Azikiwe K. Lombard, MD sent at 1/16/2018  3:35 PM CST -----  Advise patient prescription was sent to pharmacy requested.

## 2018-01-16 NOTE — TELEPHONE ENCOUNTER
----- Message from Lupe Blanca sent at 1/16/2018  1:01 PM CST -----  Contact: self  Patient would like doctor to call into pharmacy some pain meds.  Patient can be reached at [phone].          Thanks,
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
## 2018-01-16 NOTE — TELEPHONE ENCOUNTER
----- Message from Anahi Aparicio sent at 1/16/2018 11:26 AM CST -----  Contact: [phone]  Pt is requesting a 3 day supply til Friday on the pain medication pt is in a lot of pain and the otc stuff is not working Please call pt at your earliest convenience.  Thanks !

## 2018-01-16 NOTE — TELEPHONE ENCOUNTER
Spoke with patient.States that she needs something for pain now until appointment on Friday. Notified of message was sent to provider earlier and are waiting response. Verbalized understanding.

## 2018-01-19 ENCOUNTER — OFFICE VISIT (OUTPATIENT)
Dept: FAMILY MEDICINE | Facility: CLINIC | Age: 33
End: 2018-01-19
Payer: MEDICAID

## 2018-01-19 ENCOUNTER — TELEPHONE (OUTPATIENT)
Dept: FAMILY MEDICINE | Facility: CLINIC | Age: 33
End: 2018-01-19

## 2018-01-19 VITALS
WEIGHT: 116.63 LBS | TEMPERATURE: 98 F | HEIGHT: 63 IN | HEART RATE: 82 BPM | OXYGEN SATURATION: 97 % | RESPIRATION RATE: 18 BRPM | SYSTOLIC BLOOD PRESSURE: 112 MMHG | BODY MASS INDEX: 20.66 KG/M2 | DIASTOLIC BLOOD PRESSURE: 76 MMHG

## 2018-01-19 DIAGNOSIS — M54.50 CHRONIC BILATERAL LOW BACK PAIN WITHOUT SCIATICA: ICD-10-CM

## 2018-01-19 DIAGNOSIS — G89.29 CHRONIC BILATERAL LOW BACK PAIN WITHOUT SCIATICA: ICD-10-CM

## 2018-01-19 DIAGNOSIS — S01.01XS SCALP LACERATION, SEQUELA: Primary | ICD-10-CM

## 2018-01-19 PROCEDURE — 99999 PR PBB SHADOW E&M-EST. PATIENT-LVL III: CPT | Mod: PBBFAC,,, | Performed by: FAMILY MEDICINE

## 2018-01-19 PROCEDURE — 99213 OFFICE O/P EST LOW 20 MIN: CPT | Mod: PBBFAC,PO | Performed by: FAMILY MEDICINE

## 2018-01-19 PROCEDURE — 3008F BODY MASS INDEX DOCD: CPT | Mod: ,,, | Performed by: FAMILY MEDICINE

## 2018-01-19 PROCEDURE — 99215 OFFICE O/P EST HI 40 MIN: CPT | Mod: S$PBB,,, | Performed by: FAMILY MEDICINE

## 2018-01-19 RX ORDER — CLONAZEPAM 2 MG/1
2 TABLET ORAL 2 TIMES DAILY PRN
Refills: 2 | COMMUNITY
Start: 2018-01-11 | End: 2018-03-05 | Stop reason: SDUPTHER

## 2018-01-19 RX ORDER — CYPROHEPTADINE HYDROCHLORIDE 2 MG/5ML
SOLUTION ORAL
COMMUNITY
Start: 2018-01-16 | End: 2018-04-18 | Stop reason: SDUPTHER

## 2018-01-19 RX ORDER — DICLOFENAC SODIUM 75 MG/1
75 TABLET, DELAYED RELEASE ORAL 2 TIMES DAILY
Qty: 60 TABLET | Refills: 5 | Status: SHIPPED | OUTPATIENT
Start: 2018-01-19 | End: 2019-08-01

## 2018-01-19 RX ORDER — TRAMADOL HYDROCHLORIDE 50 MG/1
50 TABLET ORAL EVERY 12 HOURS PRN
Qty: 60 TABLET | Refills: 0 | Status: SHIPPED | OUTPATIENT
Start: 2018-01-19 | End: 2018-01-22 | Stop reason: SDUPTHER
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
## 2018-01-19 NOTE — TELEPHONE ENCOUNTER
----- Message from Anahi Aparicio sent at 1/19/2018 11:18 AM CST -----  Contact: [phone]  Pt was schedule to come in this morning to have her stitches removed and she missed her appointment pt wants to know if she can get in today still with the provider to have the stitches removed Please call pt at your earliest convenience.  Thanks !

## 2018-01-22 ENCOUNTER — TELEPHONE (OUTPATIENT)
Dept: FAMILY MEDICINE | Facility: CLINIC | Age: 33
End: 2018-01-22

## 2018-01-22 DIAGNOSIS — S01.01XS SCALP LACERATION, SEQUELA: ICD-10-CM

## 2018-01-22 RX ORDER — TRAMADOL HYDROCHLORIDE 50 MG/1
50 TABLET ORAL EVERY 12 HOURS PRN
Qty: 60 TABLET | Refills: 0 | Status: SHIPPED | OUTPATIENT
Start: 2018-01-22 | End: 2018-02-23 | Stop reason: SDUPTHER

## 2018-01-22 NOTE — TELEPHONE ENCOUNTER
----- Message from Henry Moran sent at 1/22/2018  2:07 PM CST -----  Contact: Self/[phone]  Patient would like the staff to send her prescription:  traMADol (ULTRAM) 50 mg tablet to [facility] on [facility]. She stated that she's currently waiting at the Pharmacy.  Thank you.

## 2018-01-22 NOTE — TELEPHONE ENCOUNTER
Called [facility]. States that Tramadol is out of stock. Pharmacist tried to send it to [facility] and they denied to take it due to misunderstanding of law. Sent copy of law and script. Will contact [facility] to check if available.

## 2018-01-22 NOTE — TELEPHONE ENCOUNTER
Called [facility].Spoke to Perla they did not receive anything from [place] pharmacy. Will let provider know to resend script to [facility].

## 2018-01-22 NOTE — TELEPHONE ENCOUNTER
Spoke with patient. Notified that script was sent to [facility]. States that they are out of tramadol and would like it to be transferred to [facility] on [address].Please advise.

## 2018-02-06 NOTE — PROCEDURES
Suture Removal  Date/Time: 2/6/2018 4:27 PM  Performed by: LOMBARD, AZIKIWE K.  Authorized by: LOMBARD, AZIKIWE K.   Body area: head/neck  Location details: scalp  Wound Appearance: clean, well healed, tender, no drainage and nonpurulent  Sutures Removed: 4  Post-removal: no dressing applied  Complications: No  Estimated blood loss (mL): 0  Specimens: No  Implants: No  Patient tolerance: Patient tolerated the procedure well with no immediate complications

## 2018-02-23 ENCOUNTER — OFFICE VISIT (OUTPATIENT)
Dept: FAMILY MEDICINE | Facility: CLINIC | Age: 33
End: 2018-02-23
Payer: MEDICAID

## 2018-02-23 VITALS
OXYGEN SATURATION: 95 % | HEART RATE: 72 BPM | BODY MASS INDEX: 20.54 KG/M2 | DIASTOLIC BLOOD PRESSURE: 84 MMHG | HEIGHT: 63 IN | TEMPERATURE: 99 F | SYSTOLIC BLOOD PRESSURE: 120 MMHG | WEIGHT: 115.94 LBS | RESPIRATION RATE: 16 BRPM

## 2018-02-23 DIAGNOSIS — Z23 NEED FOR INFLUENZA VACCINATION: ICD-10-CM

## 2018-02-23 DIAGNOSIS — G89.29 CHRONIC BILATERAL LOW BACK PAIN WITHOUT SCIATICA: Primary | ICD-10-CM

## 2018-02-23 DIAGNOSIS — F41.1 GENERALIZED ANXIETY DISORDER: ICD-10-CM

## 2018-02-23 DIAGNOSIS — M54.50 CHRONIC BILATERAL LOW BACK PAIN WITHOUT SCIATICA: Primary | ICD-10-CM

## 2018-02-23 PROCEDURE — 99215 OFFICE O/P EST HI 40 MIN: CPT | Mod: S$PBB,,, | Performed by: FAMILY MEDICINE

## 2018-02-23 PROCEDURE — 99999 PR PBB SHADOW E&M-EST. PATIENT-LVL III: CPT | Mod: PBBFAC,,, | Performed by: FAMILY MEDICINE

## 2018-02-23 PROCEDURE — 99213 OFFICE O/P EST LOW 20 MIN: CPT | Mod: PBBFAC,PO,25 | Performed by: FAMILY MEDICINE

## 2018-02-23 PROCEDURE — 90471 IMMUNIZATION ADMIN: CPT | Mod: PBBFAC,PO

## 2018-02-23 PROCEDURE — 3008F BODY MASS INDEX DOCD: CPT | Mod: ,,, | Performed by: FAMILY MEDICINE

## 2018-02-23 RX ORDER — TRAMADOL HYDROCHLORIDE 50 MG/1
50 TABLET ORAL EVERY 12 HOURS PRN
Qty: 60 TABLET | Refills: 1 | Status: SHIPPED | OUTPATIENT
Start: 2018-02-23 | End: 2018-03-25

## 2018-02-23 NOTE — PROGRESS NOTES
Chief Complaint   Patient presents with    Annual Exam    Immunizations       Marlene Munoz is a 33 y.o. female who presents per the Chief Complaint.  Pt is known to me and was last seen by me on 1/19/2018.  All known chronic medical issues have been documented.       Back Pain   This is a chronic problem. The current episode started more than 1 year ago. The problem occurs daily. The problem is unchanged. The pain is present in the lumbar spine. The quality of the pain is described as aching. The pain does not radiate. The pain is at a severity of 5/10. The pain is moderate. The pain is worse during the night. The symptoms are aggravated by sitting and twisting. Pertinent negatives include no abdominal pain, bladder incontinence, bowel incontinence, chest pain, dysuria, fever, headaches, leg pain, numbness, paresis, paresthesias, pelvic pain, perianal numbness, tingling, weakness or weight loss. She has tried analgesics for the symptoms. The treatment provided moderate relief.   Anxiety   Presents for follow-up visit. Onset was at an unknown time. The problem has been unchanged. Symptoms include excessive worry, nervous/anxious behavior and palpitations. Patient reports no chest pain, compulsions, confusion, decreased concentration, depressed mood, dizziness, dry mouth, feeling of choking, hyperventilation, insomnia, malaise, muscle tension, nausea, obsessions, panic, restlessness, shortness of breath or suicidal ideas. Symptoms occur most days. The severity of symptoms is severe. The symptoms are aggravated by family issues. The quality of sleep is fair. Nighttime awakenings: occasional.     There are no known risk factors. Her past medical history is significant for anxiety/panic attacks. There is no history of anemia, arrhythmia, bipolar disorder, CAD, CHF, chronic lung disease, depression, fibromyalgia or hyperthyroidism. Past treatments include benzodiazephines. The treatment provided moderate relief.  "Compliance with prior treatments has been variable. Side effects of treatment include auditory problems, GI discomfort, headaches, joint pain, a rash, urinary problems, visual problems and limb pain.        ROS  Review of Systems   Constitutional: Negative.  Negative for activity change, appetite change, chills, diaphoresis, fatigue, fever, unexpected weight change and weight loss.   HENT: Negative.  Negative for congestion, ear pain, hearing loss, nosebleeds, postnasal drip, rhinorrhea, sinus pressure, sneezing, sore throat and trouble swallowing.    Eyes: Negative for pain and visual disturbance.   Respiratory: Negative for cough, choking and shortness of breath.    Cardiovascular: Positive for palpitations. Negative for chest pain and leg swelling.   Gastrointestinal: Negative for abdominal pain, bowel incontinence, constipation, diarrhea, nausea and vomiting.   Genitourinary: Negative for bladder incontinence, difficulty urinating, dysuria, frequency, pelvic pain and urgency.   Musculoskeletal: Positive for back pain. Negative for arthralgias, gait problem, joint swelling and myalgias.   Skin: Negative.    Allergic/Immunologic: Negative for environmental allergies and food allergies.   Neurological: Negative.  Negative for dizziness, tingling, seizures, syncope, weakness, light-headedness, numbness, headaches and paresthesias.   Psychiatric/Behavioral: Positive for agitation, dysphoric mood and sleep disturbance. Negative for behavioral problems, confusion, decreased concentration, hallucinations and suicidal ideas. The patient is nervous/anxious. The patient does not have insomnia and is not hyperactive.        Physical Exam  Vitals:    02/23/18 0834   BP: 120/84   Pulse: 72   Resp: 16   Temp: 98.7 °F (37.1 °C)    Body mass index is 20.54 kg/m².  Weight: 52.6 kg (115 lb 15.4 oz)   Height: 5' 3" (160 cm)     Physical Exam   Constitutional: She is oriented to person, place, and time. She appears well-developed " and well-nourished. She is active and cooperative.  Non-toxic appearance. She does not have a sickly appearance. She does not appear ill. No distress.   Weight gain noted   HENT:   Head: Normocephalic and atraumatic.   Right Ear: Hearing, tympanic membrane, external ear and ear canal normal. No lacerations. No drainage, swelling or tenderness. No foreign bodies. No mastoid tenderness. Tympanic membrane is not injected, not scarred, not perforated, not erythematous, not retracted and not bulging. Tympanic membrane mobility is normal. No middle ear effusion. No hemotympanum. No decreased hearing is noted.   Left Ear: Hearing, tympanic membrane, external ear and ear canal normal. No lacerations. No drainage, swelling or tenderness. No foreign bodies. No mastoid tenderness. Tympanic membrane is not injected, not scarred, not perforated, not erythematous, not retracted and not bulging. Tympanic membrane mobility is normal.  No middle ear effusion. No hemotympanum. No decreased hearing is noted.   Nose: Nose normal. No rhinorrhea or nasal deformity.   Mouth/Throat: Uvula is midline and mucous membranes are normal. She does not have dentures. No oral lesions. No trismus in the jaw. Normal dentition. Dental caries present. No dental abscesses, uvula swelling or lacerations. No oropharyngeal exudate, posterior oropharyngeal edema, posterior oropharyngeal erythema or tonsillar abscesses. Tonsils are 2+ on the right. Tonsils are 1+ on the left. No tonsillar exudate.   Eyes: Conjunctivae, EOM and lids are normal. Pupils are equal, round, and reactive to light. Right eye exhibits no chemosis, no discharge and no exudate. No foreign body present in the right eye. Left eye exhibits no chemosis, no discharge and no exudate. No foreign body present in the left eye. No scleral icterus.   Neck: Normal range of motion and full passive range of motion without pain. Neck supple.   Cardiovascular: Normal rate, regular rhythm, S1 normal,  S2 normal and normal heart sounds.  Exam reveals no gallop and no friction rub.    No murmur heard.  Pulmonary/Chest: Effort normal and breath sounds normal. No accessory muscle usage. No respiratory distress. She has no decreased breath sounds. She has no wheezes. She has no rhonchi. She has no rales.   Abdominal: Soft. Normal appearance. She exhibits no distension. There is no hepatosplenomegaly. There is no tenderness. There is no rigidity, no rebound and no guarding.   Musculoskeletal: Normal range of motion.   Neurological: She is alert and oriented to person, place, and time. She has normal strength. No cranial nerve deficit or sensory deficit. She exhibits normal muscle tone. She displays no seizure activity. Coordination and gait normal.   Skin: Skin is warm, dry and intact. No rash noted. She is not diaphoretic.   Psychiatric: She has a normal mood and affect. Her speech is normal and behavior is normal. Judgment and thought content normal. Cognition and memory are normal. She is attentive.       Assessment & Plan    1. Chronic bilateral low back pain without sciatica  The current medical regimen is effective at this time and no changes to present plan or medications will be made at this visit.  Advised to start NSAID therapy and controlled substance medication will be discontinued in 2 months.  - traMADol (ULTRAM) 50 mg tablet; Take 1 tablet (50 mg total) by mouth every 12 (twelve) hours as needed for Pain.  Dispense: 60 tablet; Refill: 1    2. Generalized anxiety disorder  Stable; no therapeutic changes at this time.     3. Need for influenza vaccination  Patient requested Flu vaccine, and was consented and vaccine given in office without complication.   - Influenza - Quadrivalent (3 years & older) (PF)      Follow up documented    ACTIVE MEDICAL ISSUES:  Documented in Problem List    PAST MEDICAL HISTORY  Documented    PAST SURGICAL HISTORY:  Documented    SOCIAL HISTORY:  Documented    FAMILY  HISTORY:  Documented    ALLERGIES AND MEDICATIONS: updated and reviewed.  Documented    Health Maintenance       Date Due Completion Date    Influenza Vaccine 08/01/2017 3/23/2017 (Declined)    Override on 3/23/2017: Declined    Override on 12/1/2015: Done (today)    Pap Smear 03/23/2018 3/23/2017    Override on 12/1/2015: Done (had one done last months 11/2015 women [place])    TETANUS VACCINE 01/12/2028 1/12/2018

## 2018-02-23 NOTE — PROGRESS NOTES
Flu vaccine administered IM to left deltoid.VIS form given. Patient tolerated well. No adverse reaction noted.

## 2018-03-05 ENCOUNTER — TELEPHONE (OUTPATIENT)
Dept: FAMILY MEDICINE | Facility: CLINIC | Age: 33
End: 2018-03-05

## 2018-03-05 NOTE — TELEPHONE ENCOUNTER
----- Message from Emilia Varela sent at 3/5/2018  9:21 AM CST -----  Contact: Self   Patient says she need a refill. Please call patient at  [phone].      clonazePAM (KLONOPIN) 2 MG [facility], [address]

## 2018-03-05 NOTE — TELEPHONE ENCOUNTER
----- Message from Huang Crooks sent at 3/5/2018 11:58 AM CST -----  Contact: Self  Pt called to f/u on medication request. Pt can be reached @ [phone].

## 2018-03-06 NOTE — TELEPHONE ENCOUNTER
----- Message from Lupe Blanca sent at 3/6/2018 11:12 AM CST -----  Contact: self  Patient is following up on refill for clonazePAM (KLONOPIN) 2 MG Tab . Patient can be reached at [phone].      Thanks,

## 2018-03-07 ENCOUNTER — TELEPHONE (OUTPATIENT)
Dept: FAMILY MEDICINE | Facility: CLINIC | Age: 33
End: 2018-03-07

## 2018-03-07 NOTE — TELEPHONE ENCOUNTER
----- Message from Lupe Blanca sent at 3/7/2018  3:05 PM CST -----  Contact: SELF  Patient is calling regarding a refill on her lonazePAM (KLONOPIN) 2 MG Tab . Patient can be reached at  [phone].        Per patient her last refill on 2/7/2018.

## 2018-03-07 NOTE — TELEPHONE ENCOUNTER
Spoke with patient was told that medication still pending another message will sent to provider . Patient  Verbalized understand .

## 2018-03-07 NOTE — TELEPHONE ENCOUNTER
----- Message from Lupe Blanca sent at 3/7/2018  3:05 PM CST -----  Contact: SELF  Patient is calling regarding a refill on her lonazePAM (KLONOPIN) 2 MG Tab . Patient can be reached at  [phone].        Per patient her last refill on 2/7/2018.

## 2018-03-08 RX ORDER — CLONAZEPAM 2 MG/1
2 TABLET ORAL 2 TIMES DAILY PRN
Qty: 60 TABLET | Refills: 1 | Status: SHIPPED | OUTPATIENT
Start: 2018-03-08 | End: 2018-04-18 | Stop reason: SDUPTHER

## 2018-03-28 NOTE — TELEPHONE ENCOUNTER
----- Message from Henry Moran sent at 3/28/2018  7:42 AM CDT -----  Contact: Self/[phone]  Refill:  clonazePAM (KLONOPIN) 2 MG Tab    cyproheptadine (,PERIACTIN,) 2 mg/5 mL syrup    .  [employer] [facility] [address] [place] [address] [facility] [employer] [facility] Center[facility] [place] [address] [facility] [address]  Phone: [phone] Fax: [phone]    Thank you.

## 2018-03-28 NOTE — TELEPHONE ENCOUNTER
LOV 2/23/2018  Last Script for Periactin  Spoke to patient. Notified that she has 1 refill left on clonazepam,to contact pharmacy. Patient checked her bottle and realized she has a refill will contact pharmacy. Appointment scheduled at this time for next month. Would like a refill on Periactin.

## 2018-04-02 RX ORDER — CYPROHEPTADINE HYDROCHLORIDE 2 MG/5ML
SOLUTION ORAL
OUTPATIENT
Start: 2018-04-02

## 2018-04-18 ENCOUNTER — OFFICE VISIT (OUTPATIENT)
Dept: FAMILY MEDICINE | Facility: CLINIC | Age: 33
End: 2018-04-18
Payer: MEDICAID

## 2018-04-18 VITALS
BODY MASS INDEX: 19.92 KG/M2 | TEMPERATURE: 99 F | HEIGHT: 63 IN | RESPIRATION RATE: 16 BRPM | SYSTOLIC BLOOD PRESSURE: 120 MMHG | HEART RATE: 104 BPM | WEIGHT: 112.44 LBS | DIASTOLIC BLOOD PRESSURE: 80 MMHG | OXYGEN SATURATION: 97 %

## 2018-04-18 DIAGNOSIS — F51.04 PSYCHOPHYSIOLOGICAL INSOMNIA: ICD-10-CM

## 2018-04-18 DIAGNOSIS — M54.50 CHRONIC BILATERAL LOW BACK PAIN WITHOUT SCIATICA: ICD-10-CM

## 2018-04-18 DIAGNOSIS — F41.1 GENERALIZED ANXIETY DISORDER: Primary | ICD-10-CM

## 2018-04-18 DIAGNOSIS — G89.29 CHRONIC BILATERAL LOW BACK PAIN WITHOUT SCIATICA: ICD-10-CM

## 2018-04-18 DIAGNOSIS — R63.6 UNDERWEIGHT DUE TO INADEQUATE CALORIC INTAKE: ICD-10-CM

## 2018-04-18 PROCEDURE — 99999 PR PBB SHADOW E&M-EST. PATIENT-LVL III: CPT | Mod: PBBFAC,,, | Performed by: FAMILY MEDICINE

## 2018-04-18 PROCEDURE — 99215 OFFICE O/P EST HI 40 MIN: CPT | Mod: S$PBB,,, | Performed by: FAMILY MEDICINE

## 2018-04-18 PROCEDURE — 99213 OFFICE O/P EST LOW 20 MIN: CPT | Mod: PBBFAC,PO | Performed by: FAMILY MEDICINE

## 2018-04-18 RX ORDER — CYPROHEPTADINE HYDROCHLORIDE 2 MG/5ML
2 SOLUTION ORAL
Qty: 360 ML | Refills: 5 | Status: SHIPPED | OUTPATIENT
Start: 2018-04-18 | End: 2019-08-01

## 2018-04-18 RX ORDER — QUETIAPINE FUMARATE 50 MG/1
100 TABLET, FILM COATED ORAL NIGHTLY PRN
Qty: 60 TABLET | Refills: 2 | Status: SHIPPED | OUTPATIENT
Start: 2018-04-18 | End: 2019-08-01

## 2018-04-18 RX ORDER — CLONAZEPAM 2 MG/1
2 TABLET ORAL 2 TIMES DAILY PRN
Qty: 60 TABLET | Refills: 2 | Status: SHIPPED | OUTPATIENT
Start: 2018-04-18 | End: 2019-08-01

## 2018-04-18 NOTE — PROGRESS NOTES
Chief Complaint   Patient presents with    Medication Refill       Marlene Munoz is a 33 y.o. female who presents per the Chief Complaint.  Pt is known to me and was last seen by me on 2/23/2018.  All known chronic medical issues have been documented.       Back Pain   This is a chronic problem. The current episode started more than 1 year ago. The problem occurs daily. The problem is unchanged. The pain is present in the lumbar spine. The quality of the pain is described as aching. The pain does not radiate. The pain is at a severity of 5/10. The pain is moderate. The pain is worse during the night. The symptoms are aggravated by sitting and twisting. Pertinent negatives include no abdominal pain, bladder incontinence, bowel incontinence, chest pain, dysuria, fever, headaches, leg pain, numbness, paresis, paresthesias, pelvic pain, perianal numbness, tingling, weakness or weight loss. She has tried analgesics for the symptoms. The treatment provided moderate relief.   Anxiety   Presents for follow-up visit. Onset was at an unknown time. The problem has been unchanged. Symptoms include excessive worry, nervous/anxious behavior and palpitations. Patient reports no chest pain, compulsions, confusion, decreased concentration, depressed mood, dizziness, dry mouth, feeling of choking, hyperventilation, insomnia, malaise, muscle tension, nausea, obsessions, panic, restlessness, shortness of breath or suicidal ideas. Symptoms occur most days. The severity of symptoms is severe. The symptoms are aggravated by family issues. The quality of sleep is fair. Nighttime awakenings: occasional.     There are no known risk factors. Her past medical history is significant for anxiety/panic attacks. There is no history of anemia, arrhythmia, bipolar disorder, CAD, CHF, chronic lung disease, depression, fibromyalgia or hyperthyroidism. Past treatments include benzodiazephines. The treatment provided moderate relief. Compliance  "with prior treatments has been variable. Side effects of treatment include auditory problems, GI discomfort, headaches, joint pain, a rash, urinary problems, visual problems and limb pain.        ROS  Review of Systems   Constitutional: Negative.  Negative for activity change, appetite change, chills, diaphoresis, fatigue, fever, unexpected weight change and weight loss.   HENT: Negative.  Negative for congestion, ear pain, hearing loss, nosebleeds, postnasal drip, rhinorrhea, sinus pressure, sneezing, sore throat and trouble swallowing.    Eyes: Negative for pain and visual disturbance.   Respiratory: Negative for cough, choking and shortness of breath.    Cardiovascular: Positive for palpitations. Negative for chest pain and leg swelling.   Gastrointestinal: Negative for abdominal pain, bowel incontinence, constipation, diarrhea, nausea and vomiting.   Genitourinary: Negative for bladder incontinence, difficulty urinating, dysuria, frequency, pelvic pain and urgency.   Musculoskeletal: Positive for back pain. Negative for arthralgias, gait problem, joint swelling and myalgias.   Skin: Negative.    Allergic/Immunologic: Negative for environmental allergies and food allergies.   Neurological: Negative.  Negative for dizziness, tingling, seizures, syncope, weakness, light-headedness, numbness, headaches and paresthesias.   Psychiatric/Behavioral: Positive for agitation, dysphoric mood and sleep disturbance. Negative for behavioral problems, confusion, decreased concentration, hallucinations and suicidal ideas. The patient is nervous/anxious. The patient does not have insomnia and is not hyperactive.        Physical Exam  Vitals:    04/18/18 1421   BP: 120/80   Pulse: 104   Resp: 16   Temp: 98.7 °F (37.1 °C)    Body mass index is 19.92 kg/m².  Weight: 51 kg (112 lb 7 oz)   Height: 5' 3" (160 cm)     Physical Exam   Constitutional: She is oriented to person, place, and time. She appears well-developed and " well-nourished. She is active and cooperative.  Non-toxic appearance. She does not have a sickly appearance. She does not appear ill. No distress.   Weight loss noted   HENT:   Head: Normocephalic and atraumatic.       Right Ear: Hearing, tympanic membrane, external ear and ear canal normal. No lacerations. No drainage, swelling or tenderness. No foreign bodies. No mastoid tenderness. Tympanic membrane is not injected, not scarred, not perforated, not erythematous, not retracted and not bulging. Tympanic membrane mobility is normal. No middle ear effusion. No hemotympanum. No decreased hearing is noted.   Left Ear: Hearing, tympanic membrane, external ear and ear canal normal. No lacerations. No drainage, swelling or tenderness. No foreign bodies. No mastoid tenderness. Tympanic membrane is not injected, not scarred, not perforated, not erythematous, not retracted and not bulging. Tympanic membrane mobility is normal.  No middle ear effusion. No hemotympanum. No decreased hearing is noted.   Nose: Nose normal. No rhinorrhea or nasal deformity.   Mouth/Throat: Uvula is midline, oropharynx is clear and moist and mucous membranes are normal. She does not have dentures. No oral lesions. No trismus in the jaw. Normal dentition. Dental caries present. No dental abscesses, uvula swelling or lacerations. No oropharyngeal exudate, posterior oropharyngeal edema, posterior oropharyngeal erythema or tonsillar abscesses. Tonsils are 2+ on the right. Tonsils are 1+ on the left. No tonsillar exudate.   Eyes: Conjunctivae, EOM and lids are normal. Pupils are equal, round, and reactive to light. Right eye exhibits no chemosis, no discharge and no exudate. No foreign body present in the right eye. Left eye exhibits no chemosis, no discharge and no exudate. No foreign body present in the left eye. No scleral icterus.   Neck: Normal range of motion and full passive range of motion without pain. Neck supple.   Cardiovascular: Normal  rate, regular rhythm, S1 normal, S2 normal and normal heart sounds.  Exam reveals no gallop and no friction rub.    No murmur heard.  Pulmonary/Chest: Effort normal and breath sounds normal. No accessory muscle usage. No respiratory distress. She has no decreased breath sounds. She has no wheezes. She has no rhonchi. She has no rales.   Abdominal: Soft. Normal appearance. She exhibits no distension. There is no hepatosplenomegaly. There is no tenderness. There is no rigidity, no rebound and no guarding.   Musculoskeletal: Normal range of motion.   Neurological: She is alert and oriented to person, place, and time. She has normal strength. No cranial nerve deficit or sensory deficit. She exhibits normal muscle tone. She displays no seizure activity. Coordination and gait normal.   Skin: Skin is warm, dry and intact. No rash noted. She is not diaphoretic.   Psychiatric: She has a normal mood and affect. Her speech is normal and behavior is normal. Judgment and thought content normal. Cognition and memory are normal. She is attentive.       Assessment & Plan    1. Generalized anxiety disorder  Discussed current issues that are contributing to anxiety.   We also discussed treatment options, including behavior modifications, alternative therapies, and traditional medications.  Advised that BZD medication should be used with caution due to risk of habit forming behavior and potential dependence or misuse.  Agree to continue medication at this visit, and is encouraged to follow up in 3-6 months, or sooner if symptoms worsen.    - clonazePAM (KLONOPIN) 2 MG Tab; Take 1 tablet (2 mg total) by mouth 2 (two) times daily as needed.  Dispense: 60 tablet; Refill: 2    2. Chronic bilateral low back pain without sciatica  Patient was advised that pain is most likely a muscle injury or strain and to apply a heating pad to affected area up to twice daily for relief.  Also, medication was prescribed for symptom relief.  Additionally,  patient was encouraged to try stretching the muscles as tolerated as well.  Patient was advised to follow up if pain not improving, or if symptoms are worsening.     3. Underweight due to inadequate caloric intake  The current medical regimen is effective at this time and no changes to present plan or medications will be made at this visit.     - cyproheptadine (,PERIACTIN,) 2 mg/5 mL syrup; Take 5 mLs (2 mg total) by mouth 3 (three) times daily with meals.  Dispense: 360 mL; Refill: 5    4. Psychophysiological insomnia  Patient was advised to practice good sleep hygiene, which includes decreasing stimulation at least one hour before bedtime.  This includes no television, no stimulation reading or games.  Natural sleep aids were recommended as well including lavender and chamomile.  The problem of recurrent insomnia is discussed. Avoidance of caffeine sources is strongly encouraged. Sleep hygiene issues are reviewed.    - QUEtiapine (SEROQUEL) 50 MG tablet; Take 2 tablets (100 mg total) by mouth nightly as needed.  Dispense: 60 tablet; Refill: 2      Follow up documented    ACTIVE MEDICAL ISSUES:  Documented in Problem List    PAST MEDICAL HISTORY  Documented    PAST SURGICAL HISTORY:  Documented    SOCIAL HISTORY:  Documented    FAMILY HISTORY:  Documented    ALLERGIES AND MEDICATIONS: updated and reviewed.  Documented    Health Maintenance       Date Due Completion Date    Sign Pain Contract 02/17/2005 ---    Pap Smear 03/23/2018 3/23/2017    Override on 12/1/2015: Done ([facility]    TETANUS VACCINE 01/12/2028 1/12/2018

## 2018-05-07 ENCOUNTER — TELEPHONE (OUTPATIENT)
Dept: FAMILY MEDICINE | Facility: CLINIC | Age: 33
End: 2018-05-07

## 2018-05-07 NOTE — TELEPHONE ENCOUNTER
----- Message from Jennifer Peralta sent at 5/4/2018  4:44 PM CDT -----  Contact: self  Pt is asking for a letter for her  stating that she is disabled and she cannot  anything over 5 lbs. Pt states this is urgent and her food stamp case will not be processed until this is submitted.   [phone]

## 2018-05-07 NOTE — TELEPHONE ENCOUNTER
Spoke to patient. States she needs a letter stating that she is disabled and that she can not  anything over 5 lbs. Says she needs this letter ASAP. Notified of provider out of the office and will not return until tomorrow. Would like letter faxed to:  [phone] with the following information  Marlene Cokeradman  Case #[id number]

## 2018-05-07 NOTE — TELEPHONE ENCOUNTER
Attempt to contact patient to inform of provider out of the office at this time. No answer. Unable to leave voice mail due to it not being set up.

## 2018-05-07 NOTE — TELEPHONE ENCOUNTER
----- Message from Samantha Weinberg sent at 5/7/2018  1:37 PM CDT -----  Contact: self  Pt returning call. [phone].

## 2019-03-13 ENCOUNTER — TELEPHONE (OUTPATIENT)
Dept: FAMILY MEDICINE | Facility: CLINIC | Age: 34
End: 2019-03-13

## 2019-03-13 NOTE — TELEPHONE ENCOUNTER
----- Message from Henry Moran sent at 3/13/2019 10:52 AM CDT -----  Contact: Self/[phone]  Type:  Sooner Appointment Request    Patient is requesting a sooner appointment.  Patient declined first available appointment listed as well as another facility and provider .  Patient will not accept being placed on the waitlist and is requesting a message be sent to doctor.    Name of Caller:  Patient     When is the first available appointment? 04/22/19    Symptoms: Med refill (cyproheptadine (,PERIACTIN,) 2 mg/5 mL syrup)/ irregular Bleeding    Best Call Back Number: [phone]    Thank you.

## 2019-03-13 NOTE — TELEPHONE ENCOUNTER
Spoke to patient. States that she needs medication refill and a physical.Next appointment not available until May. She has been bleeding irregular and is not sure why. Patient states she does not have an OB/GYN. Provider is her only doctor. Please advise.

## 2019-03-14 NOTE — TELEPHONE ENCOUNTER
Patient has no-showed several appointments; no refills will be authorized without a face to face visit, and no overrides will be made for patient.  Schedule at next available.

## 2019-07-18 ENCOUNTER — PATIENT OUTREACH (OUTPATIENT)
Dept: ADMINISTRATIVE | Facility: HOSPITAL | Age: 34
End: 2019-07-18

## 2019-08-01 ENCOUNTER — OFFICE VISIT (OUTPATIENT)
Dept: FAMILY MEDICINE | Facility: CLINIC | Age: 34
End: 2019-08-01
Payer: MEDICAID

## 2019-08-01 VITALS
SYSTOLIC BLOOD PRESSURE: 106 MMHG | WEIGHT: 115.94 LBS | TEMPERATURE: 99 F | HEIGHT: 63 IN | HEART RATE: 92 BPM | RESPIRATION RATE: 16 BRPM | BODY MASS INDEX: 20.54 KG/M2 | OXYGEN SATURATION: 96 % | DIASTOLIC BLOOD PRESSURE: 80 MMHG

## 2019-08-01 DIAGNOSIS — Z86.39 PERSONAL HISTORY OF OTHER ENDOCRINE, NUTRITIONAL AND METABOLIC DISEASE: ICD-10-CM

## 2019-08-01 DIAGNOSIS — R63.6 UNDERWEIGHT DUE TO INADEQUATE CALORIC INTAKE: ICD-10-CM

## 2019-08-01 DIAGNOSIS — N92.1 MENORRHAGIA WITH IRREGULAR CYCLE: ICD-10-CM

## 2019-08-01 DIAGNOSIS — E55.9 VITAMIN D INSUFFICIENCY: ICD-10-CM

## 2019-08-01 DIAGNOSIS — J01.30 ACUTE NON-RECURRENT SPHENOIDAL SINUSITIS: ICD-10-CM

## 2019-08-01 DIAGNOSIS — Z13.6 ENCOUNTER FOR SPECIAL SCREENING EXAMINATION FOR CARDIOVASCULAR DISORDER: Primary | ICD-10-CM

## 2019-08-01 PROCEDURE — 99213 OFFICE O/P EST LOW 20 MIN: CPT | Mod: PBBFAC,PO | Performed by: FAMILY MEDICINE

## 2019-08-01 PROCEDURE — 99214 OFFICE O/P EST MOD 30 MIN: CPT | Mod: S$PBB,,, | Performed by: FAMILY MEDICINE

## 2019-08-01 PROCEDURE — 99999 PR PBB SHADOW E&M-EST. PATIENT-LVL III: ICD-10-PCS | Mod: PBBFAC,,, | Performed by: FAMILY MEDICINE

## 2019-08-01 PROCEDURE — 99999 PR PBB SHADOW E&M-EST. PATIENT-LVL III: CPT | Mod: PBBFAC,,, | Performed by: FAMILY MEDICINE

## 2019-08-01 PROCEDURE — 99214 PR OFFICE/OUTPT VISIT, EST, LEVL IV, 30-39 MIN: ICD-10-PCS | Mod: S$PBB,,, | Performed by: FAMILY MEDICINE

## 2019-08-01 RX ORDER — IBUPROFEN 800 MG/1
800 TABLET ORAL 3 TIMES DAILY PRN
Qty: 90 TABLET | Refills: 0 | Status: SHIPPED | OUTPATIENT
Start: 2019-08-01 | End: 2019-10-29 | Stop reason: SDUPTHER

## 2019-08-01 RX ORDER — ALPRAZOLAM 2 MG/1
2 TABLET ORAL
COMMUNITY
Start: 2016-04-28 | End: 2019-08-01

## 2019-08-01 RX ORDER — IBUPROFEN 800 MG/1
TABLET ORAL
Refills: 0 | COMMUNITY
Start: 2019-07-18 | End: 2019-08-01

## 2019-08-01 RX ORDER — CHLORHEXIDINE GLUCONATE ORAL RINSE 1.2 MG/ML
15 SOLUTION DENTAL
COMMUNITY
Start: 2018-06-08 | End: 2019-08-01

## 2019-08-01 RX ORDER — NORGESTIMATE AND ETHINYL ESTRADIOL 0.25-0.035
1 KIT ORAL
COMMUNITY
Start: 2015-09-09 | End: 2019-08-01

## 2019-08-01 RX ORDER — TRAMADOL HYDROCHLORIDE 50 MG/1
50 TABLET ORAL
COMMUNITY
Start: 2016-04-28 | End: 2019-08-01

## 2019-08-01 RX ORDER — PREDNISONE 20 MG/1
TABLET ORAL
COMMUNITY
Start: 2015-11-12 | End: 2019-08-01

## 2019-08-01 RX ORDER — OXYCODONE AND ACETAMINOPHEN 5; 325 MG/1; MG/1
TABLET ORAL
Refills: 0 | COMMUNITY
Start: 2019-07-18 | End: 2019-08-01

## 2019-08-01 RX ADMIN — METHYLPREDNISOLONE SODIUM SUCCINATE 125 MG: 125 INJECTION, POWDER, FOR SOLUTION INTRAMUSCULAR; INTRAVENOUS at 04:08

## 2019-08-01 NOTE — PROGRESS NOTES
Chief Complaint   Patient presents with    Annual Exam       Marlene Munoz is a 34 y.o. female who presents per the Chief Complaint.  Pt is known to me and was last seen by me on 4/18/2018.  All known chronic medical issues have been documented.       Back Pain   This is a chronic problem. The current episode started more than 1 year ago. The problem occurs daily. The problem is unchanged. The pain is present in the lumbar spine. The quality of the pain is described as aching. The pain does not radiate. The pain is at a severity of 5/10. The pain is moderate. The pain is worse during the night. The symptoms are aggravated by sitting and twisting. Pertinent negatives include no abdominal pain, bladder incontinence, bowel incontinence, chest pain, dysuria, fever, headaches, leg pain, numbness, paresis, paresthesias, pelvic pain, perianal numbness, tingling, weakness or weight loss. She has tried analgesics for the symptoms. The treatment provided moderate relief.   Anxiety   Presents for follow-up visit. Onset was at an unknown time. The problem has been unchanged. Symptoms include excessive worry, nervous/anxious behavior and palpitations. Patient reports no chest pain, compulsions, confusion, decreased concentration, depressed mood, dizziness, dry mouth, feeling of choking, hyperventilation, insomnia, malaise, muscle tension, nausea, obsessions, panic, restlessness, shortness of breath or suicidal ideas. Symptoms occur most days. The severity of symptoms is severe. The symptoms are aggravated by family issues. The quality of sleep is fair. Nighttime awakenings: occasional.     There are no known risk factors. Her past medical history is significant for anxiety/panic attacks. There is no history of anemia, arrhythmia, bipolar disorder, CAD, CHF, chronic lung disease, depression, fibromyalgia or hyperthyroidism. Past treatments include benzodiazephines. The treatment provided moderate relief. Compliance with  prior treatments has been variable. Side effects of treatment include auditory problems, GI discomfort, headaches, joint pain, a rash, urinary problems, visual problems and limb pain.        ROS  Review of Systems   Constitutional: Positive for appetite change. Negative for activity change, chills, diaphoresis, fatigue, fever, unexpected weight change and weight loss.   HENT: Negative.  Negative for congestion, ear pain, hearing loss, nosebleeds, postnasal drip, rhinorrhea, sinus pressure, sneezing, sore throat and trouble swallowing.    Eyes: Negative for pain and visual disturbance.   Respiratory: Negative for cough, choking and shortness of breath.    Cardiovascular: Positive for palpitations. Negative for chest pain and leg swelling.   Gastrointestinal: Negative for abdominal pain, bowel incontinence, constipation, diarrhea, nausea and vomiting.   Genitourinary: Positive for menstrual problem and vaginal bleeding. Negative for bladder incontinence, decreased urine volume, difficulty urinating, dyspareunia, dysuria, enuresis, flank pain, frequency, genital sores, hematuria, pelvic pain, urgency and vaginal discharge.   Musculoskeletal: Positive for back pain. Negative for arthralgias, gait problem, joint swelling and myalgias.   Skin: Negative.    Allergic/Immunologic: Negative for environmental allergies and food allergies.   Neurological: Negative.  Negative for dizziness, tingling, seizures, syncope, weakness, light-headedness, numbness, headaches and paresthesias.   Psychiatric/Behavioral: Positive for agitation, dysphoric mood and sleep disturbance. Negative for behavioral problems, confusion, decreased concentration, hallucinations and suicidal ideas. The patient is nervous/anxious. The patient does not have insomnia and is not hyperactive.        Physical Exam  Vitals:    08/01/19 1511   BP: 106/80   Pulse: 92   Resp: 16   Temp: 99 °F (37.2 °C)    Body mass index is 20.54 kg/m².  Weight: 52.6 kg (115 lb  "15.4 oz)   Height: 5' 3" (160 cm)     Physical Exam   Constitutional: She is oriented to person, place, and time. She appears well-developed and well-nourished. She is active and cooperative.  Non-toxic appearance. She does not have a sickly appearance. She does not appear ill. No distress.   Weight gain noted   HENT:   Head: Normocephalic and atraumatic.   Right Ear: Hearing, tympanic membrane, external ear and ear canal normal. No lacerations. No drainage, swelling or tenderness. No foreign bodies. No mastoid tenderness. Tympanic membrane is not injected, not scarred, not perforated, not erythematous, not retracted and not bulging. Tympanic membrane mobility is normal. No middle ear effusion. No hemotympanum. No decreased hearing is noted.   Left Ear: Hearing, tympanic membrane, external ear and ear canal normal. No lacerations. No drainage, swelling or tenderness. No foreign bodies. No mastoid tenderness. Tympanic membrane is not injected, not scarred, not perforated, not erythematous, not retracted and not bulging. Tympanic membrane mobility is normal.  No middle ear effusion. No hemotympanum. No decreased hearing is noted.   Nose: Nose normal. No rhinorrhea or nasal deformity.   Mouth/Throat: Uvula is midline and mucous membranes are normal. She does not have dentures. No oral lesions. No trismus in the jaw. Normal dentition. Dental caries present. No dental abscesses, uvula swelling or lacerations. No oropharyngeal exudate, posterior oropharyngeal edema, posterior oropharyngeal erythema or tonsillar abscesses. Tonsils are 2+ on the right. Tonsils are 1+ on the left. No tonsillar exudate.   Eyes: Pupils are equal, round, and reactive to light. Conjunctivae, EOM and lids are normal. Right eye exhibits no chemosis, no discharge and no exudate. No foreign body present in the right eye. Left eye exhibits no chemosis, no discharge and no exudate. No foreign body present in the left eye. No scleral icterus.   Neck: " Normal range of motion and full passive range of motion without pain. Neck supple.   Cardiovascular: Normal rate, regular rhythm, S1 normal, S2 normal and normal heart sounds. Exam reveals no gallop and no friction rub.   No murmur heard.  Pulmonary/Chest: Effort normal and breath sounds normal. No accessory muscle usage. No respiratory distress. She has no decreased breath sounds. She has no wheezes. She has no rhonchi. She has no rales.   Abdominal: Soft. Normal appearance. She exhibits no distension. There is no hepatosplenomegaly. There is no tenderness. There is no rigidity, no rebound and no guarding.   Musculoskeletal: Normal range of motion.   Neurological: She is alert and oriented to person, place, and time. She has normal strength. No cranial nerve deficit or sensory deficit. She exhibits normal muscle tone. She displays no seizure activity. Coordination and gait normal.   Skin: Skin is warm, dry and intact. No rash noted. She is not diaphoretic.   Psychiatric: She has a normal mood and affect. Her speech is normal and behavior is normal. Judgment and thought content normal. Cognition and memory are normal. She is attentive.       Assessment & Plan    Discussion of plan of care including treatment options regarding health and wellness were reviewed and discussed with patient.  Any changes to medication or treatment plan, as well as any screening blood test, imaging, or referrals to specialist, are documented.  Follow up as indicated.     1. Encounter for special screening examination for cardiovascular disorder  Screening test will be ordered and once results available patient will be notified of results and managed accordingly.    - CBC auto differential; Future  - Comprehensive metabolic panel; Future  - Lipid panel; Future    2. Acute non-recurrent sphenoidal sinusitis  Patient was advised to remain hydrated throughout illness and to use an antihistamine like loratadine or cetirizine daily.  I advised  that a multi-symptom medication like Nyquil or Tylenol Cold and Flu or a sinus decongestant like Sudafed may raise blood pressure and should be used for no more than three days to alleviate symptoms.  If symptoms worsen, patient should follow up for further evaluation.  IM steroid injection was given for symptom relief.   - methylPREDNISolone sod suc(PF) injection 125 mg    3. Personal history of other endocrine, nutritional and metabolic disease  Screening test will be ordered and once results available patient will be notified of results and managed accordingly.    - Hemoglobin A1c; Future  - TSH; Future  - T4, free; Future    4. Vitamin D insufficiency  Screening test will be ordered and once results available patient will be notified of results and managed accordingly.    - Vitamin D; Future    5. Underweight due to inadequate caloric intake  Recommended nutritional supplement to increase caloric intake.  - food supplemt, lactose-reduced (ENSURE ORIGINAL) Liqd; Take 300 mLs by mouth 3 (three) times daily.  Dispense: 90 Can; Refill: 5    6. Menorrhagia with irregular cycle  UPT negative.  NSAID therapy as needed.  - ibuprofen (ADVIL,MOTRIN) 800 MG tablet; Take 1 tablet (800 mg total) by mouth 3 (three) times daily as needed for Pain.  Dispense: 90 tablet; Refill: 0  - POCT urine pregnancy       Follow up in about 6 months (around 2/1/2020), or if symptoms worsen or fail to improve.        ACTIVE MEDICAL ISSUES:  Documented in Problem List    PAST MEDICAL HISTORY  Documented    PAST SURGICAL HISTORY:  Documented    SOCIAL HISTORY:  Documented    FAMILY HISTORY:  Documented    ALLERGIES AND MEDICATIONS: updated and reviewed.  Documented    Health Maintenance       Date Due Completion Date    Influenza Vaccine (1) 08/01/2019 ---    TETANUS VACCINE 01/12/2028 1/12/2018

## 2019-08-01 NOTE — PROGRESS NOTES
Solu-Medrol 125 mg administered IM to right ventrogluteal. Tolerated well. No adverse reaction noted.

## 2019-08-02 ENCOUNTER — LAB VISIT (OUTPATIENT)
Dept: LAB | Facility: HOSPITAL | Age: 34
End: 2019-08-02
Attending: FAMILY MEDICINE
Payer: MEDICAID

## 2019-08-02 DIAGNOSIS — E55.9 VITAMIN D INSUFFICIENCY: ICD-10-CM

## 2019-08-02 DIAGNOSIS — Z86.39 PERSONAL HISTORY OF OTHER ENDOCRINE, NUTRITIONAL AND METABOLIC DISEASE: ICD-10-CM

## 2019-08-02 DIAGNOSIS — Z13.6 ENCOUNTER FOR SPECIAL SCREENING EXAMINATION FOR CARDIOVASCULAR DISORDER: ICD-10-CM

## 2019-08-02 LAB
25(OH)D3+25(OH)D2 SERPL-MCNC: 34 NG/ML (ref 30–96)
ALBUMIN SERPL BCP-MCNC: 3.8 G/DL (ref 3.5–5.2)
ALP SERPL-CCNC: 60 U/L (ref 55–135)
ALT SERPL W/O P-5'-P-CCNC: 11 U/L (ref 10–44)
ANION GAP SERPL CALC-SCNC: 8 MMOL/L (ref 8–16)
AST SERPL-CCNC: 14 U/L (ref 10–40)
BASOPHILS # BLD AUTO: 0.03 K/UL (ref 0–0.2)
BASOPHILS NFR BLD: 0.1 % (ref 0–1.9)
BILIRUB SERPL-MCNC: 0.3 MG/DL (ref 0.1–1)
BUN SERPL-MCNC: 13 MG/DL (ref 6–20)
CALCIUM SERPL-MCNC: 10.1 MG/DL (ref 8.7–10.5)
CHLORIDE SERPL-SCNC: 106 MMOL/L (ref 95–110)
CHOLEST SERPL-MCNC: 170 MG/DL (ref 120–199)
CHOLEST/HDLC SERPL: 2.6 {RATIO} (ref 2–5)
CO2 SERPL-SCNC: 25 MMOL/L (ref 23–29)
CREAT SERPL-MCNC: 0.9 MG/DL (ref 0.5–1.4)
DIFFERENTIAL METHOD: ABNORMAL
EOSINOPHIL # BLD AUTO: 0 K/UL (ref 0–0.5)
EOSINOPHIL NFR BLD: 0 % (ref 0–8)
ERYTHROCYTE [DISTWIDTH] IN BLOOD BY AUTOMATED COUNT: 16.1 % (ref 11.5–14.5)
EST. GFR  (AFRICAN AMERICAN): >60 ML/MIN/1.73 M^2
EST. GFR  (NON AFRICAN AMERICAN): >60 ML/MIN/1.73 M^2
ESTIMATED AVG GLUCOSE: 108 MG/DL (ref 68–131)
GLUCOSE SERPL-MCNC: 123 MG/DL (ref 70–110)
HBA1C MFR BLD HPLC: 5.4 % (ref 4–5.6)
HCT VFR BLD AUTO: 39 % (ref 37–48.5)
HDLC SERPL-MCNC: 66 MG/DL (ref 40–75)
HDLC SERPL: 38.8 % (ref 20–50)
HGB BLD-MCNC: 12.4 G/DL (ref 12–16)
IMM GRANULOCYTES # BLD AUTO: 0.15 K/UL (ref 0–0.04)
IMM GRANULOCYTES NFR BLD AUTO: 0.7 % (ref 0–0.5)
LDLC SERPL CALC-MCNC: 95.8 MG/DL (ref 63–159)
LYMPHOCYTES # BLD AUTO: 2.6 K/UL (ref 1–4.8)
LYMPHOCYTES NFR BLD: 12.7 % (ref 18–48)
MCH RBC QN AUTO: 31.1 PG (ref 27–31)
MCHC RBC AUTO-ENTMCNC: 31.8 G/DL (ref 32–36)
MCV RBC AUTO: 98 FL (ref 82–98)
MONOCYTES # BLD AUTO: 0.9 K/UL (ref 0.3–1)
MONOCYTES NFR BLD: 4.1 % (ref 4–15)
NEUTROPHILS # BLD AUTO: 17 K/UL (ref 1.8–7.7)
NEUTROPHILS NFR BLD: 82.4 % (ref 38–73)
NONHDLC SERPL-MCNC: 104 MG/DL
NRBC BLD-RTO: 0 /100 WBC
PLATELET # BLD AUTO: 332 K/UL (ref 150–350)
PMV BLD AUTO: 11.3 FL (ref 9.2–12.9)
POTASSIUM SERPL-SCNC: 4.8 MMOL/L (ref 3.5–5.1)
PROT SERPL-MCNC: 7.7 G/DL (ref 6–8.4)
RBC # BLD AUTO: 3.99 M/UL (ref 4–5.4)
SODIUM SERPL-SCNC: 139 MMOL/L (ref 136–145)
T4 FREE SERPL-MCNC: 0.74 NG/DL (ref 0.71–1.51)
TRIGL SERPL-MCNC: 41 MG/DL (ref 30–150)
TSH SERPL DL<=0.005 MIU/L-ACNC: 1.34 UIU/ML (ref 0.4–4)
WBC # BLD AUTO: 20.67 K/UL (ref 3.9–12.7)

## 2019-08-02 PROCEDURE — 84439 ASSAY OF FREE THYROXINE: CPT

## 2019-08-02 PROCEDURE — 84443 ASSAY THYROID STIM HORMONE: CPT

## 2019-08-02 PROCEDURE — 83036 HEMOGLOBIN GLYCOSYLATED A1C: CPT

## 2019-08-02 PROCEDURE — 36415 COLL VENOUS BLD VENIPUNCTURE: CPT | Mod: PO

## 2019-08-02 PROCEDURE — 80061 LIPID PANEL: CPT

## 2019-08-02 PROCEDURE — 82306 VITAMIN D 25 HYDROXY: CPT

## 2019-08-02 PROCEDURE — 85025 COMPLETE CBC W/AUTO DIFF WBC: CPT

## 2019-08-02 PROCEDURE — 80053 COMPREHEN METABOLIC PANEL: CPT

## 2019-10-22 ENCOUNTER — TELEPHONE (OUTPATIENT)
Dept: FAMILY MEDICINE | Facility: CLINIC | Age: 34
End: 2019-10-22

## 2019-10-22 DIAGNOSIS — L02.429 FURUNCLE OF AXILLA, UNSPECIFIED LATERALITY: Primary | ICD-10-CM

## 2019-10-22 NOTE — TELEPHONE ENCOUNTER
----- Message from Sia Sol sent at 10/22/2019  8:04 AM CDT -----  Contact: RASTA TORRES [[id number]]   Name of Who is Calling : RASTA TORRES [[id number]]    What is the request in detail :     Patient is requesting a call from staff in regards to scheduling an appointment for her follow up she states she has two boils under her right arm she would like to have checked    ..... Please contact to further discuss and advise.    Can the clinic reply by [facility] :   Yes     What Number to Call Back : [phone]

## 2019-10-22 NOTE — TELEPHONE ENCOUNTER
Spoke to patient. Advised per Dr.Lombard to use hot compress under her arm and also antibiotic will be sent. Verbalized understanding. Patient states she would like a referral placed to [facility] so she can get surgery completed for them.Please advise

## 2019-10-22 NOTE — TELEPHONE ENCOUNTER
----- Message from Lynda Harpal sent at 10/22/2019  4:10 PM CDT -----  Contact: Self [phone]  Type: Patient Call Back    Who called:Self    What is the request in detail: pt is calling because an antibiotic was supposed to be called into the pharmacy for her and nothing was called in      Can the clinic reply by [facility]? Call back    Would the patient rather a call back or a response via [facility]? Call back    Best call back number: [phone]

## 2019-10-24 RX ORDER — SULFAMETHOXAZOLE AND TRIMETHOPRIM 800; 160 MG/1; MG/1
1 TABLET ORAL 2 TIMES DAILY
Qty: 20 TABLET | Refills: 0 | Status: SHIPPED | OUTPATIENT
Start: 2019-10-24 | End: 2019-12-11 | Stop reason: SDUPTHER

## 2019-10-29 ENCOUNTER — TELEPHONE (OUTPATIENT)
Dept: FAMILY MEDICINE | Facility: CLINIC | Age: 34
End: 2019-10-29

## 2019-10-29 DIAGNOSIS — N92.1 MENORRHAGIA WITH IRREGULAR CYCLE: ICD-10-CM

## 2019-10-29 DIAGNOSIS — L02.219 ABSCESS OF PUBIC REGION: Primary | ICD-10-CM

## 2019-10-29 NOTE — TELEPHONE ENCOUNTER
Advised patient that previous refill request was sent to provider and if approved we will give her a call.

## 2019-10-29 NOTE — TELEPHONE ENCOUNTER
Pt requesting referral for general surgery at [facility]. Stated PCP had placed one before. Pending orders. Unsure how to place location for referral. Please advise       Referral to get abscess remove right pubic area.

## 2019-10-29 NOTE — TELEPHONE ENCOUNTER
----- Message from Beatris Campoverde sent at 10/29/2019 12:11 PM CDT -----  Contact: pt  Type: RX Refill Request    Who Called: pt    Have you contacted your pharmacy:    Refill or New Rx:ibuprofen (ADVIL,MOTRIN) 800 MG tablet     RX Name and Strength:    How is the patient currently taking it? (ex. 1XDay):    Is this a 30 day or 90 day RX:    Preferred Pharmacy with phone number:  [facility] #[id number] - [place], [place] [facility] [place] [address] [address] [place] [address]  Phone: [phone] Fax: [phone]        Local or Mail Order:    Ordering Provider:    Would the patient rather a call back or a response via [facility]? Call back    Best Call Back Number:[phone]    Additional Information:

## 2019-10-29 NOTE — TELEPHONE ENCOUNTER
----- Message from Emilia Varela sent at 10/29/2019  8:11 AM CDT -----  Contact: Self   Type: Patient Call Back    Who called: Self     What is the request in detail:   Patient states she would like a referral placed to [facility] so she can get surgery completed for them.Please advise    Can the clinic reply by [facility]? No     Would the patient rather a call back or a response via [facility]?  Call     Best call back number:[phone]

## 2019-10-29 NOTE — TELEPHONE ENCOUNTER
----- Message from Emilia Varela sent at 10/29/2019  8:06 AM CDT -----  Contact: Self   Type: RX Refill Request    Who Called:  Self     Have you contacted your pharmacy: no     Refill or New Rx: refill     RX Name and Strength:ibuprofen (ADVIL,MOTRIN) 800 MG tablet      Preferred Pharmacy with phone number:[facility] #[id number] [place] [address] [phone] (Phone)  [phone] (Fax)      Local or Mail Order: Local     Ordering Provider: Dr. Lombard     Would the patient rather a call back or a response via My [facility]?  Call     Best Call Back Number:[phone]

## 2019-10-30 RX ORDER — IBUPROFEN 800 MG/1
800 TABLET ORAL 3 TIMES DAILY PRN
Qty: 90 TABLET | Refills: 2 | Status: SHIPPED | OUTPATIENT
Start: 2019-10-30 | End: 2020-03-16 | Stop reason: SDUPTHER

## 2019-11-19 ENCOUNTER — TELEPHONE (OUTPATIENT)
Dept: FAMILY MEDICINE | Facility: CLINIC | Age: 34
End: 2019-11-19

## 2019-11-19 NOTE — TELEPHONE ENCOUNTER
Patient requesting to have information resent to [facility].     [facility]  [phone] phone  [phone] fax

## 2019-11-19 NOTE — TELEPHONE ENCOUNTER
----- Message from Eveline Hector sent at 11/19/2019 12:17 PM CST -----  Contact: Patient  [phone]  Type: Patient Call Back    Who called: Patient    What is the request in detail: Referral for pt to have surgery was sent to the wrong location. Need information sent to [facility] at fax [phone] and the  [phone].  [facility] for the [facility] to be sent in order to set up the surgery. Please take care of this and contact patient when this is done.   Patient is having surgery on right arm.    Would the patient rather a call back or a response via [facility]? Call back    Best call back number: [phone]

## 2019-11-25 ENCOUNTER — TELEPHONE (OUTPATIENT)
Dept: FAMILY MEDICINE | Facility: CLINIC | Age: 34
End: 2019-11-25

## 2019-12-03 ENCOUNTER — TELEPHONE (OUTPATIENT)
Dept: FAMILY MEDICINE | Facility: CLINIC | Age: 34
End: 2019-12-03

## 2019-12-03 NOTE — TELEPHONE ENCOUNTER
----- Message from Richard Varela sent at 12/3/2019 12:21 PM CST -----  Contact: Self  Type: Patient Call Back    Who called:Self    What is the request in detail: She needs referral sent again. It needs to be sent to [facility] (ATTN)    Can the clinic reply by [facility]?No    Would the patient rather a call back or a response via [facility]? Callback    Best call back number: Fax [phone]    Additional Information:

## 2019-12-03 NOTE — TELEPHONE ENCOUNTER
Patient requesting to have referral for General Surgery re-faxed to [facility] -- ATTN:Ly  [phone].  Please be advised.

## 2019-12-11 ENCOUNTER — OFFICE VISIT (OUTPATIENT)
Dept: FAMILY MEDICINE | Facility: CLINIC | Age: 34
End: 2019-12-11
Payer: MEDICAID

## 2019-12-11 VITALS
SYSTOLIC BLOOD PRESSURE: 100 MMHG | RESPIRATION RATE: 16 BRPM | BODY MASS INDEX: 20.51 KG/M2 | HEART RATE: 72 BPM | WEIGHT: 115.75 LBS | HEIGHT: 63 IN | DIASTOLIC BLOOD PRESSURE: 80 MMHG | TEMPERATURE: 99 F

## 2019-12-11 DIAGNOSIS — R63.6 UNDERWEIGHT DUE TO INADEQUATE CALORIC INTAKE: ICD-10-CM

## 2019-12-11 DIAGNOSIS — Z23 FLU VACCINE NEED: Primary | ICD-10-CM

## 2019-12-11 DIAGNOSIS — L73.2 HIDRADENITIS SUPPURATIVA: ICD-10-CM

## 2019-12-11 PROCEDURE — 99999 PR PBB SHADOW E&M-EST. PATIENT-LVL III: ICD-10-PCS | Mod: PBBFAC,,, | Performed by: FAMILY MEDICINE

## 2019-12-11 PROCEDURE — 99214 OFFICE O/P EST MOD 30 MIN: CPT | Mod: S$PBB,,, | Performed by: FAMILY MEDICINE

## 2019-12-11 PROCEDURE — 99999 PR PBB SHADOW E&M-EST. PATIENT-LVL III: CPT | Mod: PBBFAC,,, | Performed by: FAMILY MEDICINE

## 2019-12-11 PROCEDURE — 99214 PR OFFICE/OUTPT VISIT, EST, LEVL IV, 30-39 MIN: ICD-10-PCS | Mod: S$PBB,,, | Performed by: FAMILY MEDICINE

## 2019-12-11 PROCEDURE — 90686 IIV4 VACC NO PRSV 0.5 ML IM: CPT | Mod: PBBFAC,PO

## 2019-12-11 PROCEDURE — 99213 OFFICE O/P EST LOW 20 MIN: CPT | Mod: PBBFAC,PO | Performed by: FAMILY MEDICINE

## 2019-12-11 RX ORDER — SULFAMETHOXAZOLE AND TRIMETHOPRIM 800; 160 MG/1; MG/1
1 TABLET ORAL 2 TIMES DAILY
Qty: 20 TABLET | Refills: 0 | Status: SHIPPED | OUTPATIENT
Start: 2019-12-11 | End: 2019-12-21

## 2019-12-11 RX ORDER — CYPROHEPTADINE HYDROCHLORIDE 2 MG/5ML
2 SOLUTION ORAL
Qty: 360 ML | Refills: 5 | Status: SHIPPED | OUTPATIENT
Start: 2019-12-11 | End: 2021-05-06 | Stop reason: SDUPTHER

## 2019-12-11 NOTE — PROGRESS NOTES
Chief Complaint   Patient presents with    Abscess     right axillary past month       Marlene Munoz is a 34 y.o. female who presents per the Chief Complaint.  Pt is known to me and was last seen by me on 8/1/2019.  All known chronic medical issues have been documented.       Abscess   Chronicity:  ChronicProgression Since Onset: gradually worsening  Location:  Shoulder/arm (right axilla)  Associated Symptoms: no fever, no chills, no sweats  Characteristics: draining, painful and swelling    Characteristics: no itching, no redness, no dryness, no scaling, no peeling, no bruising and no blistering    Pain Scale:  6/10  Treatments Tried:  NSAIDs, draining/squeezing, oral antibiotics and warm compresses  Relieved by:  Warm compresses, draining/squeezing and oral antibiotics  Worsened by:  Nothing       ROS  Review of Systems   Constitutional: Positive for appetite change. Negative for activity change, chills, diaphoresis, fatigue, fever and unexpected weight change.   HENT: Negative.  Negative for congestion, ear pain, hearing loss, nosebleeds, postnasal drip, rhinorrhea, sinus pressure, sneezing, sore throat and trouble swallowing.    Eyes: Negative for pain and visual disturbance.   Respiratory: Negative for cough, choking and shortness of breath.    Cardiovascular: Negative for chest pain, palpitations and leg swelling.   Gastrointestinal: Negative for abdominal pain, constipation, diarrhea, nausea and vomiting.   Genitourinary: Negative for decreased urine volume, difficulty urinating, dyspareunia, dysuria, enuresis, flank pain, frequency, genital sores, hematuria, menstrual problem, pelvic pain, urgency, vaginal bleeding and vaginal discharge.   Musculoskeletal: Negative for arthralgias, gait problem, joint swelling and myalgias.   Skin: Positive for rash.        Right armpit with painful abscess and drainage   Allergic/Immunologic: Negative for environmental allergies and food allergies.   Neurological:  "Negative.  Negative for dizziness, seizures, syncope, weakness, light-headedness, numbness and headaches.   Psychiatric/Behavioral: Positive for agitation, dysphoric mood and sleep disturbance. Negative for behavioral problems, confusion, decreased concentration, hallucinations and suicidal ideas. The patient is nervous/anxious. The patient is not hyperactive.        Physical Exam  Vitals:    12/11/19 1407   BP: 100/80   Pulse: 72   Resp: 16   Temp: 98.5 °F (36.9 °C)    Body mass index is 20.5 kg/m².  Weight: 52.5 kg (115 lb 11.9 oz)   Height: 5' 3" (160 cm)     Physical Exam   Constitutional: She is oriented to person, place, and time. She appears well-developed and well-nourished. She is active and cooperative.  Non-toxic appearance. She does not have a sickly appearance. She does not appear ill. No distress.   HENT:   Head: Normocephalic and atraumatic.   Right Ear: Hearing and external ear normal. No decreased hearing is noted.   Left Ear: Hearing and external ear normal. No decreased hearing is noted.   Nose: Nose normal. No rhinorrhea or nasal deformity.   Mouth/Throat: Uvula is midline and oropharynx is clear and moist. She does not have dentures. Normal dentition.   Eyes: Pupils are equal, round, and reactive to light. Conjunctivae, EOM and lids are normal. Right eye exhibits no chemosis, no discharge and no exudate. No foreign body present in the right eye. Left eye exhibits no chemosis, no discharge and no exudate. No foreign body present in the left eye. No scleral icterus.   Neck: Normal range of motion and full passive range of motion without pain. Neck supple.   Cardiovascular: Normal rate, regular rhythm, S1 normal, S2 normal and normal heart sounds. Exam reveals no gallop and no friction rub.   No murmur heard.  Pulmonary/Chest: Effort normal and breath sounds normal. No accessory muscle usage. No respiratory distress. She has no decreased breath sounds. She has no wheezes. She has no rhonchi. She " has no rales.   Abdominal: Soft. Normal appearance. She exhibits no distension. There is no hepatosplenomegaly. There is no tenderness. There is no rigidity, no rebound and no guarding.   Musculoskeletal: Normal range of motion.        Arms:  Neurological: She is alert and oriented to person, place, and time. She has normal strength. No cranial nerve deficit or sensory deficit. She exhibits normal muscle tone. She displays no seizure activity. Coordination and gait normal.   Skin: Skin is warm, dry and intact. No rash noted. She is not diaphoretic.   Psychiatric: She has a normal mood and affect. Her speech is normal and behavior is normal. Judgment and thought content normal. Cognition and memory are normal. She is attentive.       Assessment & Plan    Discussion of plan of care including treatment options regarding health and wellness were reviewed and discussed with patient.  Any changes to medication or treatment plan, as well as any screening blood test, imaging, or referrals to specialist, are documented.  Follow up as indicated.     1. Hidradenitis suppurativa  Chronic; left axilla surgery successful last year.  Will restart antibiotics and advised she schedule with previous surgeon to discuss repeating right sided surgery which she says was not as effective as left surgery.  - sulfamethoxazole-trimethoprim 800-160mg (BACTRIM DS) 800-160 mg Tab; Take 1 tablet by mouth 2 (two) times daily. for 10 days  Dispense: 20 tablet; Refill: 0    2. Underweight due to inadequate caloric intake  Weight improved but difficult to maintain.  The current medical regimen will be continued at this time as discussed.       - cyproheptadine (,PERIACTIN,) 2 mg/5 mL syrup; Take 5 mLs (2 mg total) by mouth 3 (three) times daily with meals.  Dispense: 360 mL; Refill: 5    3. Flu vaccine need  Patient requested Flu vaccine, and was consented and vaccine given in office without complication.   - Influenza - Quadrivalent (6 months+)  (PF)       Follow up in about 6 months (around 6/11/2020), or if symptoms worsen or fail to improve.        ACTIVE MEDICAL ISSUES:  Documented in Problem List    PAST MEDICAL HISTORY  Documented    PAST SURGICAL HISTORY:  Documented    SOCIAL HISTORY:  Documented    FAMILY HISTORY:  Documented    ALLERGIES AND MEDICATIONS: updated and reviewed.  Documented    Health Maintenance       Date Due Completion Date    Pneumococcal Vaccine (Medium Risk) (1 of 1 - PPSV23) 02/17/2006 ---    Influenza Vaccine (1) 09/01/2019 2/23/2018    TETANUS VACCINE 01/12/2028 1/12/2018

## 2019-12-27 ENCOUNTER — TELEPHONE (OUTPATIENT)
Dept: FAMILY MEDICINE | Facility: CLINIC | Age: 34
End: 2019-12-27

## 2019-12-27 RX ORDER — METHYLPREDNISOLONE 4 MG/1
TABLET ORAL
Qty: 1 PACKAGE | Refills: 0 | Status: SHIPPED | OUTPATIENT
Start: 2019-12-27 | End: 2021-04-19

## 2019-12-27 NOTE — TELEPHONE ENCOUNTER
Pt states that she has been sick since receiving flu shot. Pt admits to having fever(unknown temp), back pain, productive cough. Throat and chest pain with every cough. Would like something to be sent over to  pharmacy.

## 2019-12-27 NOTE — TELEPHONE ENCOUNTER
----- Message from Tosha Carrasco sent at 12/27/2019  8:52 AM CST -----  Contact: Self  Type: Patient Call Back    Who called:Marlene    What is the request in detail:Patient called to request medication for pink dot. She also stated that she received the flu vaccination and has been sick. Please call to advise    Can the clinic reply by [facility]?    Would the patient rather a call back or a response via [facility]? Call    Best call back number:[phone]

## 2019-12-27 NOTE — TELEPHONE ENCOUNTER
Nurse Dante already addressed this message and routed it to Dr.Lombard.   ----- Message from Ashely García sent at 12/27/2019  1:46 PM CST -----  Contact: self [phone]  .Type: Patient Call Back    Who called: self     What is the request in detail: pt states that she's still waiting for Rx to be sent over to the pharmacy     Can the clinic reply by [facility]? Call back     Would the patient rather a call back or a response via [facility]?  Call back     Best call back number: [phone]

## 2020-01-29 ENCOUNTER — TELEPHONE (OUTPATIENT)
Dept: FAMILY MEDICINE | Facility: CLINIC | Age: 35
End: 2020-01-29

## 2020-01-29 DIAGNOSIS — L73.2 HIDRADENITIS SUPPURATIVA: Primary | ICD-10-CM

## 2020-01-29 RX ORDER — HYDROCODONE BITARTRATE AND ACETAMINOPHEN 5; 325 MG/1; MG/1
1 TABLET ORAL EVERY 8 HOURS PRN
Qty: 15 TABLET | Refills: 0 | Status: SHIPPED | OUTPATIENT
Start: 2020-01-29 | End: 2020-11-02 | Stop reason: SDUPTHER

## 2020-01-29 NOTE — TELEPHONE ENCOUNTER
----- Message from Richard Varela sent at 1/29/2020 11:27 AM CST -----  Contact: Self  Type: Patient Call Back    Who called:Self    What is the request in detail: She states she needs pain medication after ED visit for boil/abscess. Please advise.    Can the clinic reply by [facility]? No    Would the patient rather a call back or a response via [facility]? Call    Best call back number:[phone]    Additional Information:n/a

## 2020-01-29 NOTE — TELEPHONE ENCOUNTER
Spoke with pt in regards to message. Pt states she had a boil lanced under her right arm this past Saturday. Pt states that she did receive abx, but only received 6 pain pills. Pt is requesting additional pain medication.

## 2020-02-06 ENCOUNTER — TELEPHONE (OUTPATIENT)
Dept: FAMILY MEDICINE | Facility: CLINIC | Age: 35
End: 2020-02-06

## 2020-02-06 NOTE — TELEPHONE ENCOUNTER
----- Message from Emilia Varela sent at 2/6/2020  2:37 PM CST -----  Contact: sELF   Type: Patient Call Back    Who called: Self     What is the request in detail:patient says she had a boil under her arm she went to [facility] ED and they lanced it and now she can hardly lift her arm. Please call     Can the clinic reply by [facility]? No     Would the patient rather a call back or a response via [facility]?  Call     Best call back number:[phone]

## 2020-02-06 NOTE — TELEPHONE ENCOUNTER
Spoke with patient per Dr. Lombard advise patient need to schedule an appoint with [facility] referral reprint for patient will leave at  , patient verbalized understand .

## 2020-03-16 DIAGNOSIS — N92.1 MENORRHAGIA WITH IRREGULAR CYCLE: ICD-10-CM

## 2020-03-16 RX ORDER — IBUPROFEN 800 MG/1
800 TABLET ORAL 3 TIMES DAILY PRN
Qty: 90 TABLET | Refills: 2 | Status: SHIPPED | OUTPATIENT
Start: 2020-03-16 | End: 2020-10-23

## 2020-03-16 NOTE — TELEPHONE ENCOUNTER
----- Message from Samantha Weinberg sent at 3/16/2020 10:41 AM CDT -----  Contact: Self   Refill : ibuprofen (ADVIL,MOTRIN) 800 MG tablet        [id number] - [place] [address] [place] [address]  [place] [address]  Phone: [phone] Fax: [phone]

## 2020-04-29 ENCOUNTER — OFFICE VISIT (OUTPATIENT)
Dept: FAMILY MEDICINE | Facility: CLINIC | Age: 35
End: 2020-04-29
Payer: MEDICAID

## 2020-04-29 DIAGNOSIS — L73.2 HIDRADENITIS SUPPURATIVA: Primary | ICD-10-CM

## 2020-04-29 PROCEDURE — 99441 PR PHYSICIAN TELEPHONE EVALUATION 5-10 MIN: ICD-10-PCS | Mod: 95,,, | Performed by: FAMILY MEDICINE

## 2020-04-29 PROCEDURE — 99441 PR PHYSICIAN TELEPHONE EVALUATION 5-10 MIN: CPT | Mod: 95,,, | Performed by: FAMILY MEDICINE

## 2020-04-29 RX ORDER — AMOXICILLIN AND CLAVULANATE POTASSIUM 875; 125 MG/1; MG/1
1 TABLET, FILM COATED ORAL 2 TIMES DAILY
Qty: 20 TABLET | Refills: 0 | Status: SHIPPED | OUTPATIENT
Start: 2020-04-29 | End: 2020-05-09

## 2020-04-29 NOTE — PROGRESS NOTES
Marlene Munoz is a 35 y.o. female.      Visit type: Virtual visit with synchronous audio only  The patient is located outside of this physical clinic but within the State of [place], and a thorough physical exam was not performed.  The chief complaint was presented by patient and discussed during visit and is documented below.    100% of visit was audio counseling, and total visit lasted 15 minutes.     Each patient provided medical services by telemedicine is:  (1) informed of the relationship between the physician and patient and the respective role of any other health care provider with respect to management of the patient; and (2) notified that he or she may decline to receive medical services by telemedicine and may withdraw from such care at any time.  Patient consented to audio only visit.    The reason for the audio only service rather than synchronous audio and video virtual visit was related to technical difficulties or patient preference/necessity.    Notes:    Abscess   Chronicity:  ChronicProgression Since Onset: gradually worsening  Location:  Shoulder/arm and ano-genital (right axilla)  Associated Symptoms: no fever, no chills, no sweats  Characteristics: draining, painful and swelling    Characteristics: no itching, no redness, no dryness, no scaling, no peeling, no bruising and no blistering    Pain Scale:  4/10  Treatments Tried:  NSAIDs, draining/squeezing, oral antibiotics and warm compresses  Relieved by:  Warm compresses, draining/squeezing and oral antibiotics  Worsened by:  Nothing       ROS  Review of Systems   Constitutional: Positive for appetite change. Negative for activity change, chills, diaphoresis, fatigue, fever and unexpected weight change.   HENT: Negative.  Negative for congestion, ear pain, hearing loss, nosebleeds, postnasal drip, rhinorrhea, sinus pressure, sneezing, sore throat and trouble swallowing.    Eyes: Negative for pain and visual disturbance.    Respiratory: Negative for cough, choking and shortness of breath.    Cardiovascular: Negative for chest pain, palpitations and leg swelling.   Gastrointestinal: Negative for abdominal pain, constipation, diarrhea, nausea and vomiting.   Genitourinary: Negative for decreased urine volume, difficulty urinating, dyspareunia, dysuria, enuresis, flank pain, frequency, genital sores, hematuria, menstrual problem, pelvic pain, urgency, vaginal bleeding and vaginal discharge.   Musculoskeletal: Negative for arthralgias, gait problem, joint swelling and myalgias.   Skin: Positive for color change, rash and wound (recurring abscesses; axilla, buttocks).        Right armpit with painful abscess and drainage   Allergic/Immunologic: Negative for environmental allergies and food allergies.   Neurological: Negative.  Negative for dizziness, seizures, syncope, weakness, light-headedness, numbness and headaches.   Psychiatric/Behavioral: Positive for agitation, dysphoric mood and sleep disturbance. Negative for behavioral problems, confusion, decreased concentration, hallucinations and suicidal ideas. The patient is nervous/anxious. The patient is not hyperactive.        Assessment & Plan    Discussion of plan of care including treatment options regarding health and wellness were reviewed and discussed with patient.  Any changes to medication or treatment plan, as well as any screening blood test, imaging, or referrals to specialist, are documented.  Follow up as indicated.     1. Hidradenitis suppurativa  Patient managed by Plastic Surgery at [facility]; was referred to Dermatology at [facility] for further evaluation.  Will restart prophylactic antibiotic to prevent painful exacerbation before appointment.  - amoxicillin-clavulanate 875-125mg (AUGMENTIN) 875-125 mg per tablet; Take 1 tablet by mouth 2 (two) times daily. for 10 days  Dispense: 20 tablet; Refill: 0       Follow up in about 3 months (around 7/29/2020), or if symptoms worsen or  fail to improve.        ACTIVE MEDICAL ISSUES:  Documented in Problem List    PAST MEDICAL HISTORY  Documented    PAST SURGICAL HISTORY:  Documented    SOCIAL HISTORY:  Documented    FAMILY HISTORY:  Documented    ALLERGIES AND MEDICATIONS: updated and reviewed.  Documented    Health Maintenance       Date Due Completion Date    Pneumococcal Vaccine (Medium Risk) (1 of 1 - PPSV23) 02/17/2006 ---    TETANUS VACCINE 01/12/2028 1/12/2018

## 2020-08-14 DIAGNOSIS — Z11.59 NEED FOR HEPATITIS C SCREENING TEST: ICD-10-CM

## 2020-10-09 ENCOUNTER — TELEPHONE (OUTPATIENT)
Dept: FAMILY MEDICINE | Facility: CLINIC | Age: 35
End: 2020-10-09

## 2020-10-09 NOTE — TELEPHONE ENCOUNTER
----- Message from Alexandra Leal sent at 10/8/2020  1:03 PM CDT -----  Regarding: pain medication  Type: Patient Call Back    Who called:self    What is the request in detail:patient called regarding getting pain medication for boil on her leg    Can the clinic reply by [facility]?no    Would the patient rather a call back or a response via [facility]? callback    Best call back number:....[phone]

## 2020-10-09 NOTE — TELEPHONE ENCOUNTER
Patient would need to be seen first.  Advise her to call to schedule appointment with Plastic Surgery at [facility] to discuss surgery.

## 2020-10-12 NOTE — TELEPHONE ENCOUNTER
Pt informed of below; she does have OV scheduled with you; states she talked to plastic surgery at [facility] and they are not going to do surgery, she is having to f/u with them every 3 months and she is going to be administering daily injections

## 2020-10-28 ENCOUNTER — TELEPHONE (OUTPATIENT)
Dept: FAMILY MEDICINE | Facility: CLINIC | Age: 35
End: 2020-10-28

## 2020-10-28 DIAGNOSIS — L73.2 HIDRADENITIS SUPPURATIVA: ICD-10-CM
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
## 2020-11-02 RX ORDER — HYDROCODONE BITARTRATE AND ACETAMINOPHEN 5; 325 MG/1; MG/1
1 TABLET ORAL EVERY 8 HOURS PRN
Qty: 15 TABLET | Refills: 0 | Status: SHIPPED | OUTPATIENT
Start: 2020-11-02 | End: 2020-11-07

## 2021-01-22 ENCOUNTER — TELEPHONE (OUTPATIENT)
Dept: FAMILY MEDICINE | Facility: CLINIC | Age: 36
End: 2021-01-22

## 2021-03-23 ENCOUNTER — TELEPHONE (OUTPATIENT)
Dept: FAMILY MEDICINE | Facility: CLINIC | Age: 36
End: 2021-03-23

## 2021-04-19 ENCOUNTER — OFFICE VISIT (OUTPATIENT)
Dept: FAMILY MEDICINE | Facility: CLINIC | Age: 36
End: 2021-04-19
Payer: MEDICAID

## 2021-04-19 VITALS
DIASTOLIC BLOOD PRESSURE: 68 MMHG | TEMPERATURE: 99 F | OXYGEN SATURATION: 98 % | HEART RATE: 87 BPM | SYSTOLIC BLOOD PRESSURE: 118 MMHG | BODY MASS INDEX: 21.48 KG/M2 | WEIGHT: 121.25 LBS

## 2021-04-19 DIAGNOSIS — Z00.00 WELL ADULT EXAM: Primary | ICD-10-CM

## 2021-04-19 DIAGNOSIS — N64.4 BREAST PAIN, RIGHT: ICD-10-CM

## 2021-04-19 LAB
B-HCG UR QL: NEGATIVE
CTP QC/QA: YES

## 2021-04-19 PROCEDURE — 81025 URINE PREGNANCY TEST: CPT | Mod: PBBFAC,PO | Performed by: FAMILY MEDICINE

## 2021-04-19 PROCEDURE — 99999 PR PBB SHADOW E&M-EST. PATIENT-LVL III: CPT | Mod: PBBFAC,,, | Performed by: FAMILY MEDICINE

## 2021-04-19 PROCEDURE — 99395 PR PREVENTIVE VISIT,EST,18-39: ICD-10-PCS | Mod: S$PBB,,, | Performed by: FAMILY MEDICINE

## 2021-04-19 PROCEDURE — 99395 PREV VISIT EST AGE 18-39: CPT | Mod: S$PBB,,, | Performed by: FAMILY MEDICINE

## 2021-04-19 PROCEDURE — 99999 PR PBB SHADOW E&M-EST. PATIENT-LVL III: ICD-10-PCS | Mod: PBBFAC,,, | Performed by: FAMILY MEDICINE

## 2021-04-19 PROCEDURE — 99213 OFFICE O/P EST LOW 20 MIN: CPT | Mod: PBBFAC,PO | Performed by: FAMILY MEDICINE

## 2021-04-27 ENCOUNTER — TELEPHONE (OUTPATIENT)
Dept: FAMILY MEDICINE | Facility: CLINIC | Age: 36
End: 2021-04-27

## 2021-04-27 NOTE — TELEPHONE ENCOUNTER
----- Message from Beatris Campoverde sent at 4/27/2021  1:48 PM CDT -----  Type: Patient Call Back    Who called:pt    What is the request in detail:pt requesting pain pills for a boil she has on her thigh. Call pt    Can the clinic reply by [facility]?    Would the patient rather a call back or a response via [facility]? call    Best call back number:[phone] (home)       Additional Information:

## 2021-05-06 DIAGNOSIS — R63.6 UNDERWEIGHT DUE TO INADEQUATE CALORIC INTAKE: ICD-10-CM

## 2021-05-07 ENCOUNTER — TELEPHONE (OUTPATIENT)
Dept: FAMILY MEDICINE | Facility: CLINIC | Age: 36
End: 2021-05-07

## 2021-05-07 RX ORDER — CYPROHEPTADINE HYDROCHLORIDE 2 MG/5ML
2 SOLUTION ORAL
Qty: 360 ML | Refills: 5 | Status: SHIPPED | OUTPATIENT
Start: 2021-05-07 | End: 2022-01-10 | Stop reason: SDUPTHER

## 2021-08-11 ENCOUNTER — LAB VISIT (OUTPATIENT)
Dept: PRIMARY CARE CLINIC | Facility: CLINIC | Age: 36
End: 2021-08-11
Payer: MEDICAID

## 2021-08-11 DIAGNOSIS — Z20.822 ENCOUNTER FOR LABORATORY TESTING FOR COVID-19 VIRUS: ICD-10-CM

## 2021-08-11 PROCEDURE — U0003 INFECTIOUS AGENT DETECTION BY NUCLEIC ACID (DNA OR RNA); SEVERE ACUTE RESPIRATORY SYNDROME CORONAVIRUS 2 (SARS-COV-2) (CORONAVIRUS DISEASE [COVID-19]), AMPLIFIED PROBE TECHNIQUE, MAKING USE OF HIGH THROUGHPUT TECHNOLOGIES AS DESCRIBED BY CMS-2020-01-R: HCPCS | Performed by: INTERNAL MEDICINE

## 2021-08-11 PROCEDURE — U0005 INFEC AGEN DETEC AMPLI PROBE: HCPCS | Performed by: INTERNAL MEDICINE

## 2021-08-13 LAB
SARS-COV-2 RNA RESP QL NAA+PROBE: NOT DETECTED
SARS-COV-2- CYCLE NUMBER: -1

## 2021-08-16 ENCOUNTER — TELEPHONE (OUTPATIENT)
Dept: FAMILY MEDICINE | Facility: CLINIC | Age: 36
End: 2021-08-16

## 2021-08-25 ENCOUNTER — LAB VISIT (OUTPATIENT)
Dept: PRIMARY CARE CLINIC | Facility: OTHER | Age: 36
End: 2021-08-25
Attending: INTERNAL MEDICINE
Payer: MEDICAID

## 2021-08-25 DIAGNOSIS — Z20.822 ENCOUNTER FOR LABORATORY TESTING FOR COVID-19 VIRUS: ICD-10-CM

## 2021-08-25 PROCEDURE — U0003 INFECTIOUS AGENT DETECTION BY NUCLEIC ACID (DNA OR RNA); SEVERE ACUTE RESPIRATORY SYNDROME CORONAVIRUS 2 (SARS-COV-2) (CORONAVIRUS DISEASE [COVID-19]), AMPLIFIED PROBE TECHNIQUE, MAKING USE OF HIGH THROUGHPUT TECHNOLOGIES AS DESCRIBED BY CMS-2020-01-R: HCPCS | Performed by: INTERNAL MEDICINE

## 2021-08-27 LAB
SARS-COV-2 RNA RESP QL NAA+PROBE: NOT DETECTED
SARS-COV-2- CYCLE NUMBER: NORMAL

## 2021-09-22 ENCOUNTER — LAB VISIT (OUTPATIENT)
Dept: PRIMARY CARE CLINIC | Facility: OTHER | Age: 36
End: 2021-09-22
Attending: INTERNAL MEDICINE
Payer: MEDICAID

## 2021-09-22 DIAGNOSIS — Z20.822 ENCOUNTER FOR LABORATORY TESTING FOR COVID-19 VIRUS: ICD-10-CM

## 2021-09-22 PROCEDURE — U0003 INFECTIOUS AGENT DETECTION BY NUCLEIC ACID (DNA OR RNA); SEVERE ACUTE RESPIRATORY SYNDROME CORONAVIRUS 2 (SARS-COV-2) (CORONAVIRUS DISEASE [COVID-19]), AMPLIFIED PROBE TECHNIQUE, MAKING USE OF HIGH THROUGHPUT TECHNOLOGIES AS DESCRIBED BY CMS-2020-01-R: HCPCS | Performed by: INTERNAL MEDICINE

## 2021-09-23 LAB
SARS-COV-2 RNA RESP QL NAA+PROBE: NOT DETECTED
SARS-COV-2- CYCLE NUMBER: NORMAL

## 2021-09-28 ENCOUNTER — TELEPHONE (OUTPATIENT)
Dept: FAMILY MEDICINE | Facility: CLINIC | Age: 36
End: 2021-09-28

## 2021-10-12 NOTE — TELEPHONE ENCOUNTER
----- Message from Henry Moran sent at 3/7/2018  9:42 AM CST -----  Contact: Self/[phone]  Patient called to follow up on a previous message regarding her refill:  clonazePAM (KLONOPIN) 2 MG Tab. Thank you.   Include Tumor Staging In Mohs Note?: Please Select the Appropriate Response

## 2022-01-01 ENCOUNTER — LAB VISIT (OUTPATIENT)
Dept: PRIMARY CARE CLINIC | Facility: OTHER | Age: 37
End: 2022-01-01
Attending: INTERNAL MEDICINE
Payer: MEDICAID

## 2022-01-01 DIAGNOSIS — Z20.822 ENCOUNTER FOR LABORATORY TESTING FOR COVID-19 VIRUS: ICD-10-CM

## 2022-01-01 PROCEDURE — U0003 INFECTIOUS AGENT DETECTION BY NUCLEIC ACID (DNA OR RNA); SEVERE ACUTE RESPIRATORY SYNDROME CORONAVIRUS 2 (SARS-COV-2) (CORONAVIRUS DISEASE [COVID-19]), AMPLIFIED PROBE TECHNIQUE, MAKING USE OF HIGH THROUGHPUT TECHNOLOGIES AS DESCRIBED BY CMS-2020-01-R: HCPCS | Mod: ST72 | Performed by: INTERNAL MEDICINE

## 2022-01-04 ENCOUNTER — TELEPHONE (OUTPATIENT)
Dept: FAMILY MEDICINE | Facility: CLINIC | Age: 37
End: 2022-01-04
Payer: MEDICAID

## 2022-01-04 NOTE — TELEPHONE ENCOUNTER
Notified pt that her test result are not in yet----- Message from Brenda Minaya sent at 1/4/2022  2:36 PM CST -----  Regarding: Results        Name of Who is Calling:  Marlene Munoz    Who Left The Message:  Marlene Munoz      What is the request in detail:       Patient called requesting her recent COVID test results.  Please further advise.   Thank you!      Reply by [facility]:  No      Preferred Call Back :  [phone]

## 2022-01-05 ENCOUNTER — TELEPHONE (OUTPATIENT)
Dept: FAMILY MEDICINE | Facility: CLINIC | Age: 37
End: 2022-01-05
Payer: MEDICAID

## 2022-01-05 LAB
SARS-COV-2 RNA RESP QL NAA+PROBE: NOT DETECTED
SARS-COV-2- CYCLE NUMBER: NORMAL

## 2022-01-05 NOTE — TELEPHONE ENCOUNTER
----- Message from Eveline Hector sent at 1/5/2022  2:05 PM CST -----  Contact: Patient [phone]  Type:  Test Results    Who Called: Patient     Name of Test (Lab/Mammo/Etc): COVID19    Date of Test: Sat., Jan., 1, 2022    Where the test was performed: [facility]    Would the patient rather a call back or a response via [facility]? Call back    Best Call Back Number: [phone]    For Clinical Team:Has the provider reviewed the results?

## 2022-01-09 ENCOUNTER — LAB VISIT (OUTPATIENT)
Dept: PRIMARY CARE CLINIC | Facility: OTHER | Age: 37
End: 2022-01-09
Attending: INTERNAL MEDICINE
Payer: MEDICAID

## 2022-01-09 DIAGNOSIS — Z20.822 ENCOUNTER FOR LABORATORY TESTING FOR COVID-19 VIRUS: ICD-10-CM

## 2022-01-09 PROCEDURE — U0003 INFECTIOUS AGENT DETECTION BY NUCLEIC ACID (DNA OR RNA); SEVERE ACUTE RESPIRATORY SYNDROME CORONAVIRUS 2 (SARS-COV-2) (CORONAVIRUS DISEASE [COVID-19]), AMPLIFIED PROBE TECHNIQUE, MAKING USE OF HIGH THROUGHPUT TECHNOLOGIES AS DESCRIBED BY CMS-2020-01-R: HCPCS | Performed by: INTERNAL MEDICINE

## 2022-01-10 ENCOUNTER — LAB VISIT (OUTPATIENT)
Dept: LAB | Facility: HOSPITAL | Age: 37
End: 2022-01-10
Attending: FAMILY MEDICINE
Payer: MEDICAID

## 2022-01-10 ENCOUNTER — OFFICE VISIT (OUTPATIENT)
Dept: FAMILY MEDICINE | Facility: CLINIC | Age: 37
End: 2022-01-10
Payer: MEDICAID

## 2022-01-10 VITALS
WEIGHT: 130.94 LBS | HEART RATE: 71 BPM | OXYGEN SATURATION: 99 % | RESPIRATION RATE: 16 BRPM | HEIGHT: 63 IN | TEMPERATURE: 99 F | DIASTOLIC BLOOD PRESSURE: 86 MMHG | BODY MASS INDEX: 23.2 KG/M2 | SYSTOLIC BLOOD PRESSURE: 120 MMHG

## 2022-01-10 DIAGNOSIS — Z00.00 WELL ADULT EXAM: Primary | ICD-10-CM

## 2022-01-10 DIAGNOSIS — N92.1 MENORRHAGIA WITH IRREGULAR CYCLE: ICD-10-CM

## 2022-01-10 DIAGNOSIS — R63.6 UNDERWEIGHT DUE TO INADEQUATE CALORIC INTAKE: ICD-10-CM

## 2022-01-10 DIAGNOSIS — Z00.00 WELL ADULT EXAM: ICD-10-CM

## 2022-01-10 LAB
25(OH)D3+25(OH)D2 SERPL-MCNC: 9 NG/ML (ref 30–96)
ALBUMIN SERPL BCP-MCNC: 3.8 G/DL (ref 3.5–5.2)
ALP SERPL-CCNC: 48 U/L (ref 55–135)
ALT SERPL W/O P-5'-P-CCNC: 11 U/L (ref 10–44)
ANION GAP SERPL CALC-SCNC: 5 MMOL/L (ref 8–16)
AST SERPL-CCNC: 16 U/L (ref 10–40)
BASOPHILS # BLD AUTO: 0.06 K/UL (ref 0–0.2)
BASOPHILS NFR BLD: 0.8 % (ref 0–1.9)
BILIRUB SERPL-MCNC: 0.4 MG/DL (ref 0.1–1)
BUN SERPL-MCNC: 8 MG/DL (ref 6–20)
CALCIUM SERPL-MCNC: 9.6 MG/DL (ref 8.7–10.5)
CHLORIDE SERPL-SCNC: 104 MMOL/L (ref 95–110)
CHOLEST SERPL-MCNC: 155 MG/DL (ref 120–199)
CHOLEST/HDLC SERPL: 2.9 {RATIO} (ref 2–5)
CO2 SERPL-SCNC: 27 MMOL/L (ref 23–29)
CREAT SERPL-MCNC: 0.8 MG/DL (ref 0.5–1.4)
DIFFERENTIAL METHOD: ABNORMAL
EOSINOPHIL # BLD AUTO: 0.4 K/UL (ref 0–0.5)
EOSINOPHIL NFR BLD: 4.8 % (ref 0–8)
ERYTHROCYTE [DISTWIDTH] IN BLOOD BY AUTOMATED COUNT: 15.8 % (ref 11.5–14.5)
EST. GFR  (AFRICAN AMERICAN): >60 ML/MIN/1.73 M^2
EST. GFR  (NON AFRICAN AMERICAN): >60 ML/MIN/1.73 M^2
ESTIMATED AVG GLUCOSE: 111 MG/DL (ref 68–131)
GLUCOSE SERPL-MCNC: 88 MG/DL (ref 70–110)
HBA1C MFR BLD: 5.5 % (ref 4–5.6)
HCT VFR BLD AUTO: 36.9 % (ref 37–48.5)
HDLC SERPL-MCNC: 54 MG/DL (ref 40–75)
HDLC SERPL: 34.8 % (ref 20–50)
HGB BLD-MCNC: 11.8 G/DL (ref 12–16)
IMM GRANULOCYTES # BLD AUTO: 0.01 K/UL (ref 0–0.04)
IMM GRANULOCYTES NFR BLD AUTO: 0.1 % (ref 0–0.5)
LDLC SERPL CALC-MCNC: 87.8 MG/DL (ref 63–159)
LYMPHOCYTES # BLD AUTO: 3.1 K/UL (ref 1–4.8)
LYMPHOCYTES NFR BLD: 39 % (ref 18–48)
MCH RBC QN AUTO: 29.7 PG (ref 27–31)
MCHC RBC AUTO-ENTMCNC: 32 G/DL (ref 32–36)
MCV RBC AUTO: 93 FL (ref 82–98)
MONOCYTES # BLD AUTO: 0.7 K/UL (ref 0.3–1)
MONOCYTES NFR BLD: 8.7 % (ref 4–15)
NEUTROPHILS # BLD AUTO: 3.7 K/UL (ref 1.8–7.7)
NEUTROPHILS NFR BLD: 46.6 % (ref 38–73)
NONHDLC SERPL-MCNC: 101 MG/DL
NRBC BLD-RTO: 0 /100 WBC
PLATELET # BLD AUTO: 299 K/UL (ref 150–450)
PMV BLD AUTO: 10.1 FL (ref 9.2–12.9)
POTASSIUM SERPL-SCNC: 4.4 MMOL/L (ref 3.5–5.1)
PROT SERPL-MCNC: 7.1 G/DL (ref 6–8.4)
RBC # BLD AUTO: 3.97 M/UL (ref 4–5.4)
SODIUM SERPL-SCNC: 136 MMOL/L (ref 136–145)
T4 FREE SERPL-MCNC: 0.75 NG/DL (ref 0.71–1.51)
TRIGL SERPL-MCNC: 66 MG/DL (ref 30–150)
TSH SERPL DL<=0.005 MIU/L-ACNC: 1.91 UIU/ML (ref 0.4–4)
WBC # BLD AUTO: 7.93 K/UL (ref 3.9–12.7)

## 2022-01-10 PROCEDURE — 99395 PREV VISIT EST AGE 18-39: CPT | Mod: S$PBB,,, | Performed by: FAMILY MEDICINE

## 2022-01-10 PROCEDURE — 84439 ASSAY OF FREE THYROXINE: CPT | Performed by: FAMILY MEDICINE

## 2022-01-10 PROCEDURE — 80061 LIPID PANEL: CPT | Performed by: FAMILY MEDICINE

## 2022-01-10 PROCEDURE — 99395 PR PREVENTIVE VISIT,EST,18-39: ICD-10-PCS | Mod: S$PBB,,, | Performed by: FAMILY MEDICINE

## 2022-01-10 PROCEDURE — 99999 PR PBB SHADOW E&M-EST. PATIENT-LVL III: ICD-10-PCS | Mod: PBBFAC,,, | Performed by: FAMILY MEDICINE

## 2022-01-10 PROCEDURE — 3008F PR BODY MASS INDEX (BMI) DOCUMENTED: ICD-10-PCS | Mod: CPTII,,, | Performed by: FAMILY MEDICINE

## 2022-01-10 PROCEDURE — 3008F BODY MASS INDEX DOCD: CPT | Mod: CPTII,,, | Performed by: FAMILY MEDICINE

## 2022-01-10 PROCEDURE — 3074F SYST BP LT 130 MM HG: CPT | Mod: CPTII,,, | Performed by: FAMILY MEDICINE

## 2022-01-10 PROCEDURE — 1159F MED LIST DOCD IN RCRD: CPT | Mod: CPTII,,, | Performed by: FAMILY MEDICINE

## 2022-01-10 PROCEDURE — 99213 OFFICE O/P EST LOW 20 MIN: CPT | Mod: PBBFAC,PO | Performed by: FAMILY MEDICINE

## 2022-01-10 PROCEDURE — 83036 HEMOGLOBIN GLYCOSYLATED A1C: CPT | Performed by: FAMILY MEDICINE

## 2022-01-10 PROCEDURE — 85025 COMPLETE CBC W/AUTO DIFF WBC: CPT | Performed by: FAMILY MEDICINE

## 2022-01-10 PROCEDURE — 3074F PR MOST RECENT SYSTOLIC BLOOD PRESSURE < 130 MM HG: ICD-10-PCS | Mod: CPTII,,, | Performed by: FAMILY MEDICINE

## 2022-01-10 PROCEDURE — 80053 COMPREHEN METABOLIC PANEL: CPT | Performed by: FAMILY MEDICINE

## 2022-01-10 PROCEDURE — 36415 COLL VENOUS BLD VENIPUNCTURE: CPT | Mod: PO | Performed by: FAMILY MEDICINE

## 2022-01-10 PROCEDURE — 82306 VITAMIN D 25 HYDROXY: CPT | Performed by: FAMILY MEDICINE

## 2022-01-10 PROCEDURE — 84443 ASSAY THYROID STIM HORMONE: CPT | Performed by: FAMILY MEDICINE

## 2022-01-10 PROCEDURE — 1160F RVW MEDS BY RX/DR IN RCRD: CPT | Mod: CPTII,,, | Performed by: FAMILY MEDICINE

## 2022-01-10 PROCEDURE — 1160F PR REVIEW ALL MEDS BY PRESCRIBER/CLIN PHARMACIST DOCUMENTED: ICD-10-PCS | Mod: CPTII,,, | Performed by: FAMILY MEDICINE

## 2022-01-10 PROCEDURE — 99999 PR PBB SHADOW E&M-EST. PATIENT-LVL III: CPT | Mod: PBBFAC,,, | Performed by: FAMILY MEDICINE

## 2022-01-10 PROCEDURE — 3079F PR MOST RECENT DIASTOLIC BLOOD PRESSURE 80-89 MM HG: ICD-10-PCS | Mod: CPTII,,, | Performed by: FAMILY MEDICINE

## 2022-01-10 PROCEDURE — 3079F DIAST BP 80-89 MM HG: CPT | Mod: CPTII,,, | Performed by: FAMILY MEDICINE

## 2022-01-10 PROCEDURE — 1159F PR MEDICATION LIST DOCUMENTED IN MEDICAL RECORD: ICD-10-PCS | Mod: CPTII,,, | Performed by: FAMILY MEDICINE

## 2022-01-10 RX ORDER — CYPROHEPTADINE HYDROCHLORIDE 2 MG/5ML
2 SOLUTION ORAL
Qty: 360 ML | Refills: 5 | Status: SHIPPED | OUTPATIENT
Start: 2022-01-10 | End: 2022-06-16

## 2022-01-10 RX ORDER — IBUPROFEN 800 MG/1
TABLET ORAL
Qty: 90 TABLET | Refills: 2 | Status: SHIPPED | OUTPATIENT
Start: 2022-01-10 | End: 2022-06-16

## 2022-01-10 NOTE — PROGRESS NOTES
"Chief Complaint   Patient presents with    Annual Exam       Marlene Munoz is a 36 y.o. female who presents per chief complain.  Chronic medical issues, if present, have been documented.  Acute medical issues, if present have been documented in the Chief Complaint.     HPI    ROS  Review of Systems   Constitutional: Positive for appetite change (increased). Negative for activity change, chills, diaphoresis, fatigue, fever and unexpected weight change.   HENT: Negative.  Negative for congestion, ear pain, hearing loss, nosebleeds, postnasal drip, rhinorrhea, sinus pressure, sneezing, sore throat and trouble swallowing.    Eyes: Negative for pain and visual disturbance.   Respiratory: Negative for cough, choking and shortness of breath.    Cardiovascular: Negative for chest pain and leg swelling.   Gastrointestinal: Negative for abdominal pain, constipation, diarrhea, nausea and vomiting.   Genitourinary: Negative for difficulty urinating, dysuria, frequency, menstrual problem and urgency.   Musculoskeletal: Positive for back pain. Negative for arthralgias, gait problem, joint swelling and myalgias.   Skin: Negative.    Allergic/Immunologic: Negative for environmental allergies and food allergies.   Neurological: Positive for headaches. Negative for dizziness, seizures, syncope, weakness and light-headedness.   Psychiatric/Behavioral: Negative.  Negative for confusion, decreased concentration, dysphoric mood and sleep disturbance. The patient is not nervous/anxious.      Physical Exam  Vitals:    01/10/22 1329   BP: 120/86   Pulse: 71   Resp: 16   Temp: 99.2 °F (37.3 °C)    Body mass index is 23.2 kg/m².  Weight: 59.4 kg (130 lb 15.3 oz)   Height: 5' 3" (160 cm)     Physical Exam  Constitutional:       General: She is not in acute distress.     Appearance: Normal appearance. She is well-developed. She is not ill-appearing, toxic-appearing or diaphoretic.   HENT:      Head: Normocephalic and atraumatic.      " Right Ear: Hearing and external ear normal. No decreased hearing noted.      Left Ear: Hearing and external ear normal. No decreased hearing noted.      Nose: Nose normal. No nasal deformity or rhinorrhea.      Mouth/Throat:      Dentition: Normal dentition. Does not have dentures.      Pharynx: Uvula midline.   Eyes:      General: Lids are normal. No scleral icterus.        Right eye: No foreign body or discharge.         Left eye: No foreign body or discharge.      Conjunctiva/sclera: Conjunctivae normal.      Right eye: No chemosis or exudate.     Left eye: No chemosis or exudate.     Pupils: Pupils are equal, round, and reactive to light.   Cardiovascular:      Rate and Rhythm: Normal rate and regular rhythm.      Heart sounds: Normal heart sounds, S1 normal and S2 normal. No murmur heard.  No friction rub. No gallop.    Pulmonary:      Effort: Pulmonary effort is normal. No accessory muscle usage or respiratory distress.      Breath sounds: Normal breath sounds. No decreased breath sounds, wheezing, rhonchi or rales.   Abdominal:      General: There is no distension.      Palpations: Abdomen is soft. Abdomen is not rigid.      Tenderness: There is no abdominal tenderness. There is no guarding or rebound.   Musculoskeletal:         General: Normal range of motion.      Cervical back: Full passive range of motion without pain, normal range of motion and neck supple.   Skin:     General: Skin is warm and dry.      Findings: No rash.   Neurological:      Mental Status: She is alert and oriented to person, place, and time.      Cranial Nerves: No cranial nerve deficit.      Sensory: No sensory deficit.      Motor: No abnormal muscle tone or seizure activity.      Coordination: Coordination normal.      Gait: Gait normal.   Psychiatric:         Attention and Perception: She is attentive.         Speech: Speech normal.         Behavior: Behavior normal. Behavior is cooperative.         Thought Content: Thought content  normal.         Judgment: Judgment normal.       Assessment & Plan    Discussion of plan of care including treatment options regarding health and wellness were reviewed and discussed with patient.  Any changes to medication or treatment plan, as well as any screening blood test, imaging, or referrals to specialist, are documented.  Follow up as indicated.     1. Well adult exam  Discussed age and gender appropriate screenings at this visit and encouraged a healthy diet with low saturated fats, and increased physical activity.  Counseled on medically appropriate vaccines based on age and current health condition.  Screening test will be ordered and once completed, patient will be notified of results when available.  If necessary, will follow up to discuss and manage further.     2. Underweight due to inadequate caloric intake  Weight gain noted; patient reports fluctuation in weight at times.  The current medical regimen will be continued at this time as discussed.   - cyproheptadine (,PERIACTIN,) 2 mg/5 mL syrup; Take 5 mLs (2 mg total) by mouth 3 (three) times daily with meals.  Dispense: 360 mL; Refill: 5    3. Menorrhagia with irregular cycle  Medication prescribed for use only as symptoms require.  Recommend she follow up with Gyn for evaluation.  - ibuprofen (ADVIL,MOTRIN) 800 MG tablet; TAKE 1 TABLET(800 MG) BY MOUTH THREE TIMES DAILY AS NEEDED FOR PAIN  Dispense: 90 tablet; Refill: 2       Follow up in about 1 year (around 1/10/2023).      ACTIVE MEDICAL ISSUES:  Documented in Problem List    PAST MEDICAL HISTORY  Documented    PAST SURGICAL HISTORY:  Documented    SOCIAL HISTORY:  Documented    FAMILY HISTORY:  Documented    ALLERGIES AND MEDICATIONS: updated and reviewed.  Documented    Health Maintenance       Date Due Completion Date    Hepatitis C Screening Never done ---    COVID-19 Vaccine (1) Never done ---    Pneumococcal Vaccines (Age 0-64) (1 of 2 - PPSV23) Never done ---    Influenza Vaccine (1)  09/01/2021 12/11/2019    TETANUS VACCINE 01/12/2028 1/12/2018

## 2022-01-10 NOTE — PROGRESS NOTES
Health Maintenance Due   Topic     Hepatitis C Screening  Ok to get tested today     COVID-19 Vaccine (1) Pt will get when ready     Pneumococcal Vaccines (Age 0-64) (1 of 2 - PPSV23) Pt decline     Influenza Vaccine (1) Pt decline

## 2022-01-11 LAB
SARS-COV-2 RNA RESP QL NAA+PROBE: NOT DETECTED
SARS-COV-2- CYCLE NUMBER: NORMAL

## 2022-03-21 ENCOUNTER — TELEPHONE (OUTPATIENT)
Dept: FAMILY MEDICINE | Facility: CLINIC | Age: 37
End: 2022-03-21
Payer: MEDICAID

## 2022-03-21 NOTE — TELEPHONE ENCOUNTER
Pt advised.    Pt advised to OBGYN per Dr. Lombard. Pt states she has had her regular cycle last month but is now bleeding to the point it is draining her and pouring out. Pt states good understanding.

## 2022-04-25 ENCOUNTER — TELEPHONE (OUTPATIENT)
Dept: FAMILY MEDICINE | Facility: CLINIC | Age: 37
End: 2022-04-25
Payer: MEDICAID

## 2022-04-25 NOTE — TELEPHONE ENCOUNTER
----- Message from Richard Varela sent at 4/25/2022  3:52 PM CDT -----  Regarding: Call  Contact: Patient  Type: Patient Call Back    Who called:Patient    What is the request in detail: Patient is requesting a call back. Please advise.    Can the clinic reply by [facility]? No    Would the patient rather a call back or a response via [facility]? Call    Best call back number: [phone]    Additional Information:    Thanks

## 2022-05-16 ENCOUNTER — OFFICE VISIT (OUTPATIENT)
Dept: FAMILY MEDICINE | Facility: CLINIC | Age: 37
End: 2022-05-16
Payer: MEDICAID

## 2022-05-16 VITALS
HEIGHT: 63 IN | BODY MASS INDEX: 23.48 KG/M2 | OXYGEN SATURATION: 99 % | SYSTOLIC BLOOD PRESSURE: 120 MMHG | TEMPERATURE: 99 F | HEART RATE: 72 BPM | DIASTOLIC BLOOD PRESSURE: 80 MMHG | WEIGHT: 132.5 LBS | RESPIRATION RATE: 16 BRPM

## 2022-05-16 DIAGNOSIS — Z01.818 PREOPERATIVE CLEARANCE: Primary | ICD-10-CM

## 2022-05-16 PROCEDURE — 3079F PR MOST RECENT DIASTOLIC BLOOD PRESSURE 80-89 MM HG: ICD-10-PCS | Mod: CPTII,,, | Performed by: NURSE PRACTITIONER

## 2022-05-16 PROCEDURE — 99999 PR PBB SHADOW E&M-EST. PATIENT-LVL IV: ICD-10-PCS | Mod: PBBFAC,,, | Performed by: NURSE PRACTITIONER

## 2022-05-16 PROCEDURE — 1160F RVW MEDS BY RX/DR IN RCRD: CPT | Mod: CPTII,,, | Performed by: NURSE PRACTITIONER

## 2022-05-16 PROCEDURE — 3044F PR MOST RECENT HEMOGLOBIN A1C LEVEL <7.0%: ICD-10-PCS | Mod: CPTII,,, | Performed by: NURSE PRACTITIONER

## 2022-05-16 PROCEDURE — 99999 PR PBB SHADOW E&M-EST. PATIENT-LVL IV: CPT | Mod: PBBFAC,,, | Performed by: NURSE PRACTITIONER

## 2022-05-16 PROCEDURE — 93005 ELECTROCARDIOGRAM TRACING: CPT | Mod: PBBFAC,PO | Performed by: INTERNAL MEDICINE

## 2022-05-16 PROCEDURE — 3074F SYST BP LT 130 MM HG: CPT | Mod: CPTII,,, | Performed by: NURSE PRACTITIONER

## 2022-05-16 PROCEDURE — 1159F MED LIST DOCD IN RCRD: CPT | Mod: CPTII,,, | Performed by: NURSE PRACTITIONER

## 2022-05-16 PROCEDURE — 93010 EKG 12-LEAD: ICD-10-PCS | Mod: S$PBB,,, | Performed by: INTERNAL MEDICINE

## 2022-05-16 PROCEDURE — 3008F PR BODY MASS INDEX (BMI) DOCUMENTED: ICD-10-PCS | Mod: CPTII,,, | Performed by: NURSE PRACTITIONER

## 2022-05-16 PROCEDURE — 93010 ELECTROCARDIOGRAM REPORT: CPT | Mod: S$PBB,,, | Performed by: INTERNAL MEDICINE

## 2022-05-16 PROCEDURE — 1160F PR REVIEW ALL MEDS BY PRESCRIBER/CLIN PHARMACIST DOCUMENTED: ICD-10-PCS | Mod: CPTII,,, | Performed by: NURSE PRACTITIONER

## 2022-05-16 PROCEDURE — 1159F PR MEDICATION LIST DOCUMENTED IN MEDICAL RECORD: ICD-10-PCS | Mod: CPTII,,, | Performed by: NURSE PRACTITIONER

## 2022-05-16 PROCEDURE — 3008F BODY MASS INDEX DOCD: CPT | Mod: CPTII,,, | Performed by: NURSE PRACTITIONER

## 2022-05-16 PROCEDURE — 3044F HG A1C LEVEL LT 7.0%: CPT | Mod: CPTII,,, | Performed by: NURSE PRACTITIONER

## 2022-05-16 PROCEDURE — 99214 PR OFFICE/OUTPT VISIT, EST, LEVL IV, 30-39 MIN: ICD-10-PCS | Mod: S$PBB,,, | Performed by: NURSE PRACTITIONER

## 2022-05-16 PROCEDURE — 99214 OFFICE O/P EST MOD 30 MIN: CPT | Mod: PBBFAC,PO | Performed by: NURSE PRACTITIONER

## 2022-05-16 PROCEDURE — 3074F PR MOST RECENT SYSTOLIC BLOOD PRESSURE < 130 MM HG: ICD-10-PCS | Mod: CPTII,,, | Performed by: NURSE PRACTITIONER

## 2022-05-16 PROCEDURE — 3079F DIAST BP 80-89 MM HG: CPT | Mod: CPTII,,, | Performed by: NURSE PRACTITIONER

## 2022-05-16 PROCEDURE — 99214 OFFICE O/P EST MOD 30 MIN: CPT | Mod: S$PBB,,, | Performed by: NURSE PRACTITIONER

## 2022-05-16 NOTE — PROGRESS NOTES
"Subjective:      Patient ID: Marlene Munoz is a 37 y.o. female.  New to me but seen previously in clinic by a fellow provider. Pt presents to clinic for preoperative exam. Is scheduled to have breast augmentation on 5/23/22 by a surgeon in [place], [place]. Denies any acute complaints, no PMHx and does not take any medications.     Review of Systems   Constitutional: Negative for activity change, appetite change, diaphoresis, fatigue, fever and unexpected weight change.   HENT: Negative.    Respiratory: Negative for chest tightness and shortness of breath.    Cardiovascular: Negative for chest pain, palpitations, leg swelling and claudication.   Gastrointestinal: Negative for abdominal pain, change in bowel habit, constipation, diarrhea, nausea, vomiting and change in bowel habit.   Genitourinary: Negative.  Negative for difficulty urinating and dysuria.   Musculoskeletal: Negative.    Integumentary:  Negative.   Neurological: Negative.  Negative for headaches.   Psychiatric/Behavioral: Negative.    All other systems reviewed and are negative.        Objective:     Vitals:    05/16/22 1553   BP: 120/80   Pulse: 72   Resp: 16   Temp: 98.6 °F (37 °C)   TempSrc: Oral   SpO2: 99%   Weight: 60.1 kg (132 lb 7.9 oz)   Height: 5' 3" (1.6 m)     Physical Exam  Vitals and nursing note reviewed.   Constitutional:       General: She is not in acute distress.     Appearance: Normal appearance. She is well-developed, well-groomed and normal weight. She is not ill-appearing.   HENT:      Head: Normocephalic and atraumatic.      Right Ear: External ear normal.      Left Ear: External ear normal.      Nose: Nose normal.      Mouth/Throat:      Lips: Pink.      Mouth: Mucous membranes are moist.   Eyes:      General: Lids are normal. Vision grossly intact. Gaze aligned appropriately.      Extraocular Movements: Extraocular movements intact.      Conjunctiva/sclera: Conjunctivae normal.      Pupils: Pupils are equal, round, and reactive " to light.   Neck:      Trachea: Trachea and phonation normal.   Cardiovascular:      Rate and Rhythm: Normal rate and regular rhythm.      Pulses: Normal pulses.      Heart sounds: Normal heart sounds.   Pulmonary:      Effort: Pulmonary effort is normal. No accessory muscle usage or respiratory distress.      Breath sounds: Normal breath sounds and air entry.   Abdominal:      General: Abdomen is flat. Bowel sounds are normal. There is no distension.      Palpations: Abdomen is soft.      Tenderness: There is no abdominal tenderness.   Musculoskeletal:      Cervical back: Normal range of motion and neck supple.      Right lower leg: No edema.      Left lower leg: No edema.   Skin:     General: Skin is warm and dry.      Findings: No rash.   Neurological:      General: No focal deficit present.      Mental Status: She is alert and oriented to person, place, and time. Mental status is at baseline.   Psychiatric:         Attention and Perception: Attention and perception normal.         Mood and Affect: Mood and affect normal.         Speech: Speech normal.         Behavior: Behavior normal. Behavior is cooperative.         Thought Content: Thought content normal.         Cognition and Memory: Cognition and memory normal.         Judgment: Judgment normal.     EXAMINATION 5/17/2022:  XR CHEST PA AND LATERAL  FINDINGS:  Heart normal.  Lungs clear.  Impression:  Normal stable chest.    12 LEAD ECG  Vent. Rate : 067 BPM     Atrial Rate : 067 BPM      P-R Int : 148 ms          QRS Dur : 076 ms       QT Int : 380 ms       P-R-T Axes : -03 011 011 degrees      QTc Int : 401 ms   Normal sinus rhythm   Normal ECG     MAMMOGRAM   This procedure was performed using tomosynthesis.   Computer-aided detection was utilized in the interpretation of this examination.  The breasts are heterogeneously dense, which may obscure small masses. There is no evidence of suspicious masses, microcalcifications or architectural distortion.  No  mammographic evidence of malignancy.  BI-RADS Category 1: Negative  Recommendation:  Annual mammogram is recommended beginning at age 40.  Your estimated lifetime risk of breast cancer (to age 85) based on Tyrer-Cuzick risk assessment model is 7.3 %.  According to the American Cancer Society, patients with a lifetime breast cancer risk of 20% or higher might benefit from supplemental screening tests. ?     Latest Reference Range & Units 01/10/22 14:10 05/17/22 15:00   WBC 3.90 - 12.70 K/uL 7.93 9.27   RBC 4.00 - 5.40 M/uL 3.97 (L) 3.84 (L)   Hemoglobin 12.0 - 16.0 g/dL 11.8 (L) 11.5 (L)   Hematocrit 37.0 - 48.5 % 36.9 (L) 35.1 (L)   MCV 82 - 98 fL 93 91   MCH 27.0 - 31.0 pg 29.7 29.9   MCHC 32.0 - 36.0 g/dL 32.0 32.8   RDW 11.5 - 14.5 % 15.8 (H) 16.7 (H)   Platelets 150 - 450 K/uL 299 351   MPV 9.2 - 12.9 fL 10.1 10.0   Gran % 38.0 - 73.0 % 46.6 45.8   Lymph % 18.0 - 48.0 % 39.0 39.7   Mono % 4.0 - 15.0 % 8.7 9.6   Eosinophil % 0.0 - 8.0 % 4.8 4.2   Basophil % 0.0 - 1.9 % 0.8 0.5   Immature Granulocytes 0.0 - 0.5 % 0.1 0.2   Gran # (ANC) 1.8 - 7.7 K/uL 3.7 4.2   Lymph # 1.0 - 4.8 K/uL 3.1 3.7   Mono # 0.3 - 1.0 K/uL 0.7 0.9   Eos # 0.0 - 0.5 K/uL 0.4 0.4   Baso # 0.00 - 0.20 K/uL 0.06 0.05   Immature Grans (Abs) 0.00 - 0.04 K/uL 0.01 [1] 0.02 [2]   NRBC 0 /100 WBC 0 0   Differential Method  Automated Automated   Protime 9.0 - 12.5 sec  11.5   INR 0.8 - 1.2   1.1 [3]   Sodium 136 - 145 mmol/L 136 139   Potassium 3.5 - 5.1 mmol/L 4.4 3.4 (L)   Chloride 95 - 110 mmol/L 104 101   CO2 23 - 29 mmol/L 27 28   Anion Gap 8 - 16 mmol/L 5 (L) 10   BUN 6 - 20 mg/dL 8 7   Creatinine 0.5 - 1.4 mg/dL 0.8 1.1   EGFR if non African American >60 mL/min/1.73 m^2 >60.0 [4] >60 [5]   EGFR if African American >60 mL/min/1.73 m^2 >60.0 >60   Glucose 70 - 110 mg/dL 88 147 (H)   Calcium 8.7 - 10.5 mg/dL 9.6 9.8   Alkaline Phosphatase 55 - 135 U/L 48 (L) 61   PROTEIN TOTAL 6.0 - 8.4 g/dL 7.1 7.2   Albumin 3.5 - 5.2 g/dL 3.8 4.1   BILIRUBIN  TOTAL 0.1 - 1.0 mg/dL 0.4 [6] 0.8 [7]   AST 10 - 40 U/L 16 12   ALT 10 - 44 U/L 11 9 (L)   Cholesterol 120 - 199 mg/dL 155 [8]    HDL 40 - 75 mg/dL 54 [9]    HDL/Cholesterol Ratio 20.0 - 50.0 % 34.8    LDL Cholesterol External 63.0 - 159.0 mg/dL 87.8 [10]    Non-HDL Cholesterol mg/dL 101 [11]    Total Cholesterol/HDL Ratio 2.0 - 5.0  2.9    Triglycerides 30 - 150 mg/dL 66 [12]    Vit D, 25-Hydroxy 30 - 96 ng/mL 9 (L) [13]    Hemoglobin A1C External 4.0 - 5.6 % 5.5 [14]    Estimated Avg Glucose 68 - 131 mg/dL 111    TSH 0.400 - 4.000 uIU/mL 1.913 2.398   Free T4 0.71 - 1.51 ng/dL 0.75    Hepatitis C Ab Negative   Negative   (L): Data is abnormally low  (H): Data is abnormally high  [1] Mild elevation in immature granulocytes is non specific and   can be seen in a variety of conditions including stress response,   acute inflammation, trauma and pregnancy. Correlation with other   laboratory and clinical findings is essential.    [2] Mild elevation in immature granulocytes is non specific and   can be seen in a variety of conditions including stress response,   acute inflammation, trauma and pregnancy. Correlation with other   laboratory and clinical findings is essential.    [3] Coumadin Therapy:  2.0 - 3.0 for INR for all indicators except mechanical heart valves  and antiphospholipid syndromes which should use 2.5 - 3.5.    [4] Calculation used to obtain the estimated glomerular filtration  rate (eGFR) is the CKD-EPI equation.     [5] Calculation used to obtain the estimated glomerular filtration  rate (eGFR) is the CKD-EPI equation.     [6] For infants and newborns, interpretation of results should be based  on gestational age, weight and in agreement with clinical  observations.    Premature Infant recommended reference ranges:  Up to 24 hours.............<8.0 mg/dL  Up to 48 hours............<12.0 mg/dL  3-5 days..................<15.0 mg/dL  6-29 days.................<15.0 mg/dL    [7] For infants and newborns,  interpretation of results should be based  on gestational age, weight and in agreement with clinical  observations.    Premature Infant recommended reference ranges:  Up to 24 hours.............<8.0 mg/dL  Up to 48 hours............<12.0 mg/dL  3-5 days..................<15.0 mg/dL  6-29 days.................<15.0 mg/dL    [8] The National Cholesterol Education Program (NCEP) has set the  following guidelines (reference ranges) for Cholesterol:  Optimal.....................<200 mg/dL  Borderline High.............200-239 mg/dL  High........................> or = 240 mg/dL    [9] The National Cholesterol Education Program (NCEP) has set the  following guidelines (reference values) for HDL Cholesterol:  Low...............<40 mg/dL  Optimal...........>60 mg/dL    [10] The National Cholesterol Education Program (NCEP) has set the  following guidelines (reference values) for LDL Cholesterol:  Optimal.......................<130 mg/dL  Borderline High...............130-159 mg/dL  High..........................160-189 mg/dL  Very High.....................>190 mg/dL    [11] Risk category and Non-HDL cholesterol goals:  Coronary heart disease (CHD)or equivalent (10-year risk of CHD >20%):  Non-HDL cholesterol goal     <130 mg/dL  Two or more CHD risk factors and 10-year risk of CHD <= 20%:  Non-HDL cholesterol goal     <160 mg/dL  0 to 1 CHD risk factor:  Non-HDL cholesterol goal     <190 mg/dL    [12] The National Cholesterol Education Program (NCEP) has set the  following guidelines (reference values) for triglycerides:  Normal......................<150 mg/dL  Borderline High.............150-199 mg/dL  High........................200-499 mg/dL    [13] Vitamin D deficiency.........<10 ng/mL                              Vitamin D insufficiency......10-29 ng/mL       Vitamin D sufficiency........> or equal to 30 ng/mL  Vitamin D toxicity............>100 ng/mL    [14] ADA Screening Guidelines:  5.7-6.4%  Consistent with  prediabetes  >or=6.5%  Consistent with diabetes    High levels of fetal hemoglobin interfere with the HbA1C  assay. Heterozygous hemoglobin variants (HbS, HgC, etc)do  not significantly interfere with this assay.   However, presence of multiple variants may affect accuracy.  Assessment and Plan:     1. Preoperative clearance  Preoperative workup as follows chest x-ray, ECG, hemoglobin, hematocrit, electrolytes, creatinine, glucose, liver function studies, coagulation studies, urinalysis (urinary tract instrumentation planned).  Change in medication regimen before surgery: not applicable, not on any medications.  Prophylaxis for cardiac events with perioperative beta-blockers: should be considered, specific regimen per anesthesia.  Invasive hemodynamic monitoring perioperatively: at the discretion of anesthesiologist.  Deep vein thrombosis prophylaxis postoperatively:regimen to be chosen by surgical team.  Surveillance for postoperative MI with ECG immediately postoperatively and on postoperative days 1 and 2 AND troponin levels 24 hours postoperatively and on day 4 or hospital discharge (whichever comes first): at the discretion of anesthesiologist.  - CBC Auto Differential; Future  - Comprehensive Metabolic Panel; Future  - IN OFFICE EKG 12-LEAD (to Muse)  - X-Ray Chest PA And Lateral; Future  - Mammo Digital Screening Bilat; Future  - TSH; Future  - POCT PT/INR  - Hepatitis C Antibody; Future  - Urinalysis, Reflex to Urine Culture Urine, Clean Catch       **PATIENT MEDICALLY CLEARED TO PROCEED WITH LOW RISK SURGICAL PROCEDURE**    ROBY Vega, FNP-C  Family/Internal Medicine  [facility]

## 2022-05-17 ENCOUNTER — HOSPITAL ENCOUNTER (OUTPATIENT)
Dept: RADIOLOGY | Facility: HOSPITAL | Age: 37
Discharge: HOME | End: 2022-05-17
Attending: NURSE PRACTITIONER
Payer: MEDICAID

## 2022-05-17 VITALS — WEIGHT: 132.5 LBS | HEIGHT: 63 IN | BODY MASS INDEX: 23.48 KG/M2

## 2022-05-17 DIAGNOSIS — Z01.818 PREOPERATIVE CLEARANCE: ICD-10-CM

## 2022-05-17 PROCEDURE — 77063 BREAST TOMOSYNTHESIS BI: CPT | Mod: TC

## 2022-05-17 PROCEDURE — 77063 BREAST TOMOSYNTHESIS BI: CPT | Mod: 26,,, | Performed by: RADIOLOGY

## 2022-05-17 PROCEDURE — 77063 MAMMO DIGITAL SCREENING BILAT WITH TOMO: ICD-10-PCS | Mod: 26,,, | Performed by: RADIOLOGY

## 2022-05-17 PROCEDURE — 71046 XR CHEST PA AND LATERAL: ICD-10-PCS | Mod: 26,,, | Performed by: RADIOLOGY

## 2022-05-17 PROCEDURE — 71046 X-RAY EXAM CHEST 2 VIEWS: CPT | Mod: TC,FY

## 2022-05-17 PROCEDURE — 77067 MAMMO DIGITAL SCREENING BILAT WITH TOMO: ICD-10-PCS | Mod: 26,,, | Performed by: RADIOLOGY

## 2022-05-17 PROCEDURE — 71046 X-RAY EXAM CHEST 2 VIEWS: CPT | Mod: 26,,, | Performed by: RADIOLOGY

## 2022-05-17 PROCEDURE — 77067 SCR MAMMO BI INCL CAD: CPT | Mod: TC

## 2022-05-17 PROCEDURE — 77067 SCR MAMMO BI INCL CAD: CPT | Mod: 26,,, | Performed by: RADIOLOGY

## 2022-05-24 ENCOUNTER — TELEPHONE (OUTPATIENT)
Dept: FAMILY MEDICINE | Facility: CLINIC | Age: 37
End: 2022-05-24
Payer: MEDICAID

## 2022-05-24 NOTE — TELEPHONE ENCOUNTER
----- Message from Marisel Hong sent at 5/24/2022  3:07 PM CDT -----  Type: Patient Call Back    Who called:Self    What is the request in detail: Pt states that the office where she is receiving surgery states that they never received her xrays. Pt states that she needs that information sent over as soon as possible.    Can the clinic reply by [facility]? no    Would the patient rather a call back or a response via [facility]? Call back    Best call back number: [phone] (home)

## 2022-05-24 NOTE — TELEPHONE ENCOUNTER
Return call to patient,and informed that it was faxed today. Patient states that she will like to come to the off and  a copy. Patient informed that we are open unttil 5pm. Patient acknowledged understanding.

## 2022-05-30 ENCOUNTER — PATIENT MESSAGE (OUTPATIENT)
Dept: ADMINISTRATIVE | Facility: HOSPITAL | Age: 37
End: 2022-05-30
Payer: MEDICAID

## 2022-05-31 ENCOUNTER — TELEPHONE (OUTPATIENT)
Dept: FAMILY MEDICINE | Facility: CLINIC | Age: 37
End: 2022-05-31
Payer: MEDICAID

## 2022-05-31 NOTE — TELEPHONE ENCOUNTER
Return call to patient, and informed that I spoke to her on 5-. That her pre-op paperwork was faxed, and she stated that she was coming to the office to  a copy at the . That the copy is still at the  for her to . Patient acknowledged understanding.

## 2022-05-31 NOTE — TELEPHONE ENCOUNTER
----- Message from Yolanda Ruiz sent at 5/31/2022 11:14 AM CDT -----  Regarding: Results  Name of Who is Calling:RASTA TORRES [[id number]]          What is the request in detail: Patient is requesting a call back in reference to results being sent over to provider           Can the clinic reply by [facility]: No           What Number to Call Back if not in [facility]: [phone]

## 2022-06-09 ENCOUNTER — TELEPHONE (OUTPATIENT)
Dept: FAMILY MEDICINE | Facility: CLINIC | Age: 37
End: 2022-06-09
Payer: MEDICAID

## 2022-06-09 NOTE — TELEPHONE ENCOUNTER
----- Message from Ramos Contreras sent at 6/9/2022 10:45 AM CDT -----  Type: Patient Call Back    Who called:self    What is the request in detail: Pt is scheduled for surgery on 6/23. Pt needs a medical clearance done by her PCP before 6/21.     Can the clinic reply by [facility]?no    Would the patient rather a call back or a response via [facility]? Call    Best call back number:[phone] (home)

## 2022-06-16 ENCOUNTER — LAB VISIT (OUTPATIENT)
Dept: LAB | Facility: HOSPITAL | Age: 37
End: 2022-06-16
Attending: FAMILY MEDICINE
Payer: MEDICAID

## 2022-06-16 ENCOUNTER — OFFICE VISIT (OUTPATIENT)
Dept: FAMILY MEDICINE | Facility: CLINIC | Age: 37
End: 2022-06-16
Payer: MEDICAID

## 2022-06-16 VITALS
BODY MASS INDEX: 24.34 KG/M2 | RESPIRATION RATE: 16 BRPM | TEMPERATURE: 98 F | HEART RATE: 89 BPM | OXYGEN SATURATION: 99 % | WEIGHT: 137.38 LBS | SYSTOLIC BLOOD PRESSURE: 110 MMHG | HEIGHT: 63 IN | DIASTOLIC BLOOD PRESSURE: 74 MMHG

## 2022-06-16 DIAGNOSIS — Z01.818 PRE-OP TESTING: ICD-10-CM

## 2022-06-16 DIAGNOSIS — L73.2 HIDRADENITIS SUPPURATIVA: ICD-10-CM

## 2022-06-16 DIAGNOSIS — Z01.818 PREOPERATIVE CLEARANCE: ICD-10-CM

## 2022-06-16 DIAGNOSIS — Z01.818 PREOPERATIVE CLEARANCE: Primary | ICD-10-CM

## 2022-06-16 DIAGNOSIS — Z91.89 AT HIGH RISK FOR IMBALANCED NUTRITION: ICD-10-CM

## 2022-06-16 PROBLEM — R63.6 UNDERWEIGHT DUE TO INADEQUATE CALORIC INTAKE: Status: RESOLVED | Noted: 2022-01-10 | Resolved: 2022-06-16

## 2022-06-16 LAB
ANION GAP SERPL CALC-SCNC: 10 MMOL/L (ref 8–16)
BUN SERPL-MCNC: 14 MG/DL (ref 6–20)
CALCIUM SERPL-MCNC: 9.3 MG/DL (ref 8.7–10.5)
CHLORIDE SERPL-SCNC: 106 MMOL/L (ref 95–110)
CO2 SERPL-SCNC: 20 MMOL/L (ref 23–29)
CREAT SERPL-MCNC: 1 MG/DL (ref 0.5–1.4)
EST. GFR  (AFRICAN AMERICAN): >60 ML/MIN/1.73 M^2
EST. GFR  (NON AFRICAN AMERICAN): >60 ML/MIN/1.73 M^2
ESTIMATED AVG GLUCOSE: 114 MG/DL (ref 68–131)
GLUCOSE SERPL-MCNC: 142 MG/DL (ref 70–110)
HBA1C MFR BLD: 5.6 % (ref 4–5.6)
POTASSIUM SERPL-SCNC: 4.2 MMOL/L (ref 3.5–5.1)
SODIUM SERPL-SCNC: 136 MMOL/L (ref 136–145)

## 2022-06-16 PROCEDURE — 83036 HEMOGLOBIN GLYCOSYLATED A1C: CPT | Performed by: FAMILY MEDICINE

## 2022-06-16 PROCEDURE — 1159F PR MEDICATION LIST DOCUMENTED IN MEDICAL RECORD: ICD-10-PCS | Mod: CPTII,,, | Performed by: FAMILY MEDICINE

## 2022-06-16 PROCEDURE — 3078F PR MOST RECENT DIASTOLIC BLOOD PRESSURE < 80 MM HG: ICD-10-PCS | Mod: CPTII,,, | Performed by: FAMILY MEDICINE

## 2022-06-16 PROCEDURE — 3044F PR MOST RECENT HEMOGLOBIN A1C LEVEL <7.0%: ICD-10-PCS | Mod: CPTII,,, | Performed by: FAMILY MEDICINE

## 2022-06-16 PROCEDURE — 3008F PR BODY MASS INDEX (BMI) DOCUMENTED: ICD-10-PCS | Mod: CPTII,,, | Performed by: FAMILY MEDICINE

## 2022-06-16 PROCEDURE — U0005 INFEC AGEN DETEC AMPLI PROBE: HCPCS | Performed by: FAMILY MEDICINE

## 2022-06-16 PROCEDURE — 99214 PR OFFICE/OUTPT VISIT, EST, LEVL IV, 30-39 MIN: ICD-10-PCS | Mod: S$PBB,,, | Performed by: FAMILY MEDICINE

## 2022-06-16 PROCEDURE — 99213 OFFICE O/P EST LOW 20 MIN: CPT | Mod: PBBFAC,PO | Performed by: FAMILY MEDICINE

## 2022-06-16 PROCEDURE — 1160F RVW MEDS BY RX/DR IN RCRD: CPT | Mod: CPTII,,, | Performed by: FAMILY MEDICINE

## 2022-06-16 PROCEDURE — 3008F BODY MASS INDEX DOCD: CPT | Mod: CPTII,,, | Performed by: FAMILY MEDICINE

## 2022-06-16 PROCEDURE — 80048 BASIC METABOLIC PNL TOTAL CA: CPT | Performed by: FAMILY MEDICINE

## 2022-06-16 PROCEDURE — 99214 OFFICE O/P EST MOD 30 MIN: CPT | Mod: S$PBB,,, | Performed by: FAMILY MEDICINE

## 2022-06-16 PROCEDURE — U0003 INFECTIOUS AGENT DETECTION BY NUCLEIC ACID (DNA OR RNA); SEVERE ACUTE RESPIRATORY SYNDROME CORONAVIRUS 2 (SARS-COV-2) (CORONAVIRUS DISEASE [COVID-19]), AMPLIFIED PROBE TECHNIQUE, MAKING USE OF HIGH THROUGHPUT TECHNOLOGIES AS DESCRIBED BY CMS-2020-01-R: HCPCS | Performed by: FAMILY MEDICINE

## 2022-06-16 PROCEDURE — 3074F SYST BP LT 130 MM HG: CPT | Mod: CPTII,,, | Performed by: FAMILY MEDICINE

## 2022-06-16 PROCEDURE — 87389 HIV-1 AG W/HIV-1&-2 AB AG IA: CPT | Performed by: FAMILY MEDICINE

## 2022-06-16 PROCEDURE — 1159F MED LIST DOCD IN RCRD: CPT | Mod: CPTII,,, | Performed by: FAMILY MEDICINE

## 2022-06-16 PROCEDURE — 99999 PR PBB SHADOW E&M-EST. PATIENT-LVL III: ICD-10-PCS | Mod: PBBFAC,,, | Performed by: FAMILY MEDICINE

## 2022-06-16 PROCEDURE — 36415 COLL VENOUS BLD VENIPUNCTURE: CPT | Mod: PO | Performed by: FAMILY MEDICINE

## 2022-06-16 PROCEDURE — 3044F HG A1C LEVEL LT 7.0%: CPT | Mod: CPTII,,, | Performed by: FAMILY MEDICINE

## 2022-06-16 PROCEDURE — 1160F PR REVIEW ALL MEDS BY PRESCRIBER/CLIN PHARMACIST DOCUMENTED: ICD-10-PCS | Mod: CPTII,,, | Performed by: FAMILY MEDICINE

## 2022-06-16 PROCEDURE — 3074F PR MOST RECENT SYSTOLIC BLOOD PRESSURE < 130 MM HG: ICD-10-PCS | Mod: CPTII,,, | Performed by: FAMILY MEDICINE

## 2022-06-16 PROCEDURE — 3078F DIAST BP <80 MM HG: CPT | Mod: CPTII,,, | Performed by: FAMILY MEDICINE

## 2022-06-16 PROCEDURE — 99999 PR PBB SHADOW E&M-EST. PATIENT-LVL III: CPT | Mod: PBBFAC,,, | Performed by: FAMILY MEDICINE

## 2022-06-16 RX ORDER — CYPROHEPTADINE HYDROCHLORIDE 2 MG/5ML
2 SOLUTION ORAL
Qty: 360 ML | Refills: 5 | Status: SHIPPED | OUTPATIENT
Start: 2022-06-16

## 2022-06-16 NOTE — PROGRESS NOTES
Health Maintenance Due   Topic Date Due    Cervical Cancer Screening  03/23/2020    COVID-19 Vaccine (3 - Booster for Pfizer series) 07/03/2022

## 2022-06-16 NOTE — LETTER
June 16, 2022    Marlene Munoz  [address] [facility] [place] [address]         [facility]  [address] [facility] [place] [address]  Phone: [phone]  Fax: [phone] June 16, 2022     Patient: Marlene Munoz   YOB: 1985   Date of Visit: 6/16/2022       To Whom It May Concern:    It is my medical opinion that Marlene Munoz is medically cleared for surgery on June 23.  I will review results of ordered test and if necessary, discuss with surgeon any appropriate action necessary.  I will submit all necessary clinical information to the surgeon.  Patient is encouraged to follow up after surgery to discuss results and manage as necessary.     If you have any questions or concerns, please don't hesitate to call.    Sincerely,         Azikiwe K. Lombard, MD

## 2022-06-16 NOTE — PROGRESS NOTES
"Chief Complaint   Patient presents with    Pre-op Exam       Marlene Munoz is a 37 y.o. female who presents per chief complain.  Chronic medical issues, if present, have been documented.  Acute medical issues, if present have been documented in the Chief Complaint.      HPI    ROS  Review of Systems   Constitutional: Negative.  Negative for activity change, appetite change, chills, diaphoresis, fatigue, fever and unexpected weight change.   HENT: Negative.  Negative for congestion, ear pain, hearing loss, nosebleeds, postnasal drip, rhinorrhea, sinus pressure, sneezing, sore throat and trouble swallowing.    Eyes: Negative for pain and visual disturbance.   Respiratory: Negative for cough, choking and shortness of breath.    Cardiovascular: Negative for chest pain and leg swelling.   Gastrointestinal: Negative for abdominal pain, constipation, diarrhea, nausea and vomiting.   Genitourinary: Negative for difficulty urinating, dysuria, frequency and urgency.   Musculoskeletal: Negative.  Negative for arthralgias, back pain, gait problem, joint swelling and myalgias.   Skin: Negative.    Allergic/Immunologic: Negative for environmental allergies and food allergies.   Neurological: Negative.  Negative for dizziness, seizures, syncope, weakness, light-headedness and headaches.   Psychiatric/Behavioral: Negative.  Negative for confusion, decreased concentration, dysphoric mood and sleep disturbance. The patient is not nervous/anxious.      Physical Exam  Vitals:    06/16/22 1001   BP: 110/74   Pulse: 89   Resp: 16   Temp: 97.8 °F (36.6 °C)    Body mass index is 24.33 kg/m².  Weight: 62.3 kg (137 lb 5.6 oz)   Height: 5' 3" (160 cm)     Physical Exam  Constitutional:       General: She is not in acute distress.     Appearance: Normal appearance. She is well-developed. She is not ill-appearing, toxic-appearing or diaphoretic.   HENT:      Head: Normocephalic and atraumatic.      Right Ear: Hearing and external ear " normal. No decreased hearing noted.      Left Ear: Hearing and external ear normal. No decreased hearing noted.      Nose: Nose normal. No nasal deformity or rhinorrhea.      Mouth/Throat:      Dentition: Normal dentition. Does not have dentures.      Pharynx: Uvula midline.   Eyes:      General: Lids are normal. No scleral icterus.        Right eye: No foreign body or discharge.         Left eye: No foreign body or discharge.      Conjunctiva/sclera: Conjunctivae normal.      Right eye: No chemosis or exudate.     Left eye: No chemosis or exudate.     Pupils: Pupils are equal, round, and reactive to light.   Cardiovascular:      Rate and Rhythm: Normal rate and regular rhythm.      Heart sounds: Normal heart sounds, S1 normal and S2 normal. No murmur heard.    No friction rub. No gallop.   Pulmonary:      Effort: Pulmonary effort is normal. No accessory muscle usage or respiratory distress.      Breath sounds: Normal breath sounds. No decreased breath sounds, wheezing, rhonchi or rales.   Abdominal:      General: There is no distension.      Palpations: Abdomen is soft. Abdomen is not rigid.      Tenderness: There is no abdominal tenderness. There is no guarding or rebound.   Musculoskeletal:         General: Normal range of motion.      Cervical back: Full passive range of motion without pain, normal range of motion and neck supple.   Skin:     General: Skin is warm and dry.      Findings: No rash.   Neurological:      Mental Status: She is alert and oriented to person, place, and time.      Cranial Nerves: No cranial nerve deficit.      Sensory: No sensory deficit.      Motor: No abnormal muscle tone or seizure activity.      Coordination: Coordination normal.      Gait: Gait normal.   Psychiatric:         Attention and Perception: She is attentive.         Speech: Speech normal.         Behavior: Behavior normal. Behavior is cooperative.         Thought Content: Thought content normal.         Judgment: Judgment  normal.       Assessment & Plan    Discussion of plan of care including treatment options regarding health and wellness were reviewed and discussed with patient.  Any changes to medication or treatment plan, as well as any screening blood test, imaging, or referrals to specialist, are documented.  Follow up as indicated.     1. Preoperative clearance  Based on physical exam today, Marlene Munoz is cleared to undergo planned surgery.  I will review results of ordered test and if necessary, discuss with surgeon any appropriate action necessary.  I will submit all necessary clinical information to the surgeon.  Patient is encouraged to follow up after surgery to discuss results and manage as necessary.   - Hemoglobin A1C; Future  - HIV 1/2 Ag/Ab (4th Gen); Future  - Basic Metabolic Panel; Future  - COVID-19 Routine Screening; Future    2. Hidradenitis suppurativa  Chronic condition; will continue to document and monitor.      3. At high risk for imbalanced nutrition  The current medical regimen will be continued at this time as discussed.   - cyproheptadine (,PERIACTIN,) 2 mg/5 mL syrup; Take 5 mLs (2 mg total) by mouth 3 (three) times daily with meals.  Dispense: 360 mL; Refill: 5       Follow up in about 6 months (around 12/16/2022).      ACTIVE MEDICAL ISSUES:  Documented in Problem List    PAST MEDICAL HISTORY  Documented    PAST SURGICAL HISTORY:  Documented    SOCIAL HISTORY:  Documented    FAMILY HISTORY:  Documented    ALLERGIES AND MEDICATIONS: updated and reviewed.  Documented    Health Maintenance       Date Due Completion Date    Cervical Cancer Screening 03/23/2020 3/23/2017    Override on 12/1/2015: Done ([facility])    COVID-19 Vaccine (3 - Booster for Pfizer series) 07/03/2022 2/3/2022    Influenza Vaccine (Season Ended) 09/01/2022 12/11/2019    TETANUS VACCINE 01/12/2028 1/12/2018

## 2022-06-17 LAB
HIV 1+2 AB+HIV1 P24 AG SERPL QL IA: NEGATIVE
SARS-COV-2 RNA RESP QL NAA+PROBE: NOT DETECTED
SARS-COV-2- CYCLE NUMBER: NORMAL

## 2022-06-21 ENCOUNTER — TELEPHONE (OUTPATIENT)
Dept: FAMILY MEDICINE | Facility: CLINIC | Age: 37
End: 2022-06-21
Payer: MEDICAID

## 2022-06-21 ENCOUNTER — CLINICAL SUPPORT (OUTPATIENT)
Dept: FAMILY MEDICINE | Facility: CLINIC | Age: 37
End: 2022-06-21
Payer: MEDICAID

## 2022-06-21 ENCOUNTER — LAB VISIT (OUTPATIENT)
Dept: LAB | Facility: HOSPITAL | Age: 37
End: 2022-06-21
Attending: FAMILY MEDICINE
Payer: MEDICAID

## 2022-06-21 DIAGNOSIS — Z01.818 PREOPERATIVE CLEARANCE: ICD-10-CM

## 2022-06-21 DIAGNOSIS — Z01.818 PREOPERATIVE CLEARANCE: Primary | ICD-10-CM

## 2022-06-21 LAB
ALBUMIN SERPL BCP-MCNC: 3.9 G/DL (ref 3.5–5.2)
ALP SERPL-CCNC: 59 U/L (ref 55–135)
ALT SERPL W/O P-5'-P-CCNC: 9 U/L (ref 10–44)
ANION GAP SERPL CALC-SCNC: 10 MMOL/L (ref 8–16)
AST SERPL-CCNC: 16 U/L (ref 10–40)
BASOPHILS # BLD AUTO: 0.05 K/UL (ref 0–0.2)
BASOPHILS NFR BLD: 0.5 % (ref 0–1.9)
BILIRUB SERPL-MCNC: 0.2 MG/DL (ref 0.1–1)
BUN SERPL-MCNC: 9 MG/DL (ref 6–20)
CALCIUM SERPL-MCNC: 9.5 MG/DL (ref 8.7–10.5)
CHLORIDE SERPL-SCNC: 107 MMOL/L (ref 95–110)
CO2 SERPL-SCNC: 23 MMOL/L (ref 23–29)
CREAT SERPL-MCNC: 1 MG/DL (ref 0.5–1.4)
DIFFERENTIAL METHOD: ABNORMAL
EOSINOPHIL # BLD AUTO: 0.6 K/UL (ref 0–0.5)
EOSINOPHIL NFR BLD: 6.4 % (ref 0–8)
ERYTHROCYTE [DISTWIDTH] IN BLOOD BY AUTOMATED COUNT: 16.9 % (ref 11.5–14.5)
EST. GFR  (AFRICAN AMERICAN): >60 ML/MIN/1.73 M^2
EST. GFR  (NON AFRICAN AMERICAN): >60 ML/MIN/1.73 M^2
GLUCOSE SERPL-MCNC: 89 MG/DL (ref 70–110)
HCG INTACT+B SERPL-ACNC: <2.4 MIU/ML
HCT VFR BLD AUTO: 36.4 % (ref 37–48.5)
HGB BLD-MCNC: 11.6 G/DL (ref 12–16)
IMM GRANULOCYTES # BLD AUTO: 0.02 K/UL (ref 0–0.04)
IMM GRANULOCYTES NFR BLD AUTO: 0.2 % (ref 0–0.5)
LYMPHOCYTES # BLD AUTO: 3.9 K/UL (ref 1–4.8)
LYMPHOCYTES NFR BLD: 41.7 % (ref 18–48)
MCH RBC QN AUTO: 29.4 PG (ref 27–31)
MCHC RBC AUTO-ENTMCNC: 31.9 G/DL (ref 32–36)
MCV RBC AUTO: 92 FL (ref 82–98)
MONOCYTES # BLD AUTO: 0.6 K/UL (ref 0.3–1)
MONOCYTES NFR BLD: 6.5 % (ref 4–15)
NEUTROPHILS # BLD AUTO: 4.1 K/UL (ref 1.8–7.7)
NEUTROPHILS NFR BLD: 44.7 % (ref 38–73)
NRBC BLD-RTO: 0 /100 WBC
PLATELET # BLD AUTO: 347 K/UL (ref 150–450)
PMV BLD AUTO: 10.6 FL (ref 9.2–12.9)
POTASSIUM SERPL-SCNC: 3.9 MMOL/L (ref 3.5–5.1)
PROT SERPL-MCNC: 7.4 G/DL (ref 6–8.4)
RBC # BLD AUTO: 3.94 M/UL (ref 4–5.4)
SODIUM SERPL-SCNC: 140 MMOL/L (ref 136–145)
WBC # BLD AUTO: 9.27 K/UL (ref 3.9–12.7)

## 2022-06-21 PROCEDURE — 36415 COLL VENOUS BLD VENIPUNCTURE: CPT | Mod: PO | Performed by: FAMILY MEDICINE

## 2022-06-21 PROCEDURE — 85025 COMPLETE CBC W/AUTO DIFF WBC: CPT | Performed by: FAMILY MEDICINE

## 2022-06-21 PROCEDURE — 85610 PROTHROMBIN TIME: CPT | Performed by: FAMILY MEDICINE

## 2022-06-21 PROCEDURE — 93010 EKG 12-LEAD: ICD-10-PCS | Mod: S$PBB,,, | Performed by: INTERNAL MEDICINE

## 2022-06-21 PROCEDURE — 85730 THROMBOPLASTIN TIME PARTIAL: CPT | Performed by: FAMILY MEDICINE

## 2022-06-21 PROCEDURE — 80053 COMPREHEN METABOLIC PANEL: CPT | Performed by: FAMILY MEDICINE

## 2022-06-21 PROCEDURE — 93010 ELECTROCARDIOGRAM REPORT: CPT | Mod: S$PBB,,, | Performed by: INTERNAL MEDICINE

## 2022-06-21 PROCEDURE — 84702 CHORIONIC GONADOTROPIN TEST: CPT | Performed by: FAMILY MEDICINE

## 2022-06-21 PROCEDURE — 93005 ELECTROCARDIOGRAM TRACING: CPT | Mod: PBBFAC,PO | Performed by: INTERNAL MEDICINE

## 2022-06-21 NOTE — TELEPHONE ENCOUNTER
Left voicemail for pt to return call to clinic. Pt was in the clinic at the time of attempting to speak with her     This nurse contacted [facility] pre Operative Dept. Spoke with a representative to clear up the message the patient called in to the clinic.This nurse  has been informed that the patient needs a new glucose because the lab that was sent in will not clear the patient. All the other labs are not within the 30 day pt need to complete new labs.   This nurse has informed the representative to fax over all needed requirements for the patient to precede with procedure. And to inform the patient of the information he has presented to this nurse.     Pt has been advised of information. Awaiting new fax.

## 2022-06-21 NOTE — TELEPHONE ENCOUNTER
----- Message from Marisel Hong sent at 6/21/2022 10:17 AM CDT -----  Type: Patient Call Back    Who called:Self    What is the request in detail: Pt states that she is having plastic surgery and is leaving tomorrow. Pt states that paperwork was faxed over for Dr. Lombard to complete and sign off on. Pt states she need her 30 day records and a glucose order. Please call to assist     Can the clinic reply by [facility]? no    Would the patient rather a call back or a response via [facility]? Call back    Best call back number: [phone] (home)

## 2022-06-21 NOTE — TELEPHONE ENCOUNTER
----- Message from Minnie Kwong sent at 6/21/2022 10:54 AM CDT -----  Regarding: self [phone]  Type:  Patient Returning Call    Who Called:  self    Who Left Message for Patient: Na   Does the patient know what this is regarding?: NA    Would the patient rather a call back or a response via [facility]?  Call back    Best Call Back Number:[phone]      Additional Information: patient is at the  and needs labs and paper work today.

## 2022-06-22 ENCOUNTER — TELEPHONE (OUTPATIENT)
Dept: FAMILY MEDICINE | Facility: CLINIC | Age: 37
End: 2022-06-22
Payer: MEDICAID

## 2022-06-22 LAB
APTT BLDCRRT: 30.8 SEC (ref 21–32)
INR PPP: 1 (ref 0.8–1.2)
PROTHROMBIN TIME: 10.7 SEC (ref 9–12.5)

## 2022-06-22 NOTE — TELEPHONE ENCOUNTER
----- Message from Ramos Contreras sent at 6/22/2022  8:52 AM CDT -----  Type:  Test Results    [facility]    Name of Test (Lab/Mammo/Etc): lab    Date of Test: 6/21    Ordering Provider: Dr. Lombard    Where the test was performed: [facility]    Would the patient rather a call back or a response via [facility]? call    Best Call Back Number: [phone] ext [phone]  Fax: [phone]    Additional Information: Pt is scheduled for surgery, facility is calling for results.     For Clinical Team:Has the provider reviewed the results?

## 2022-06-24 ENCOUNTER — TELEPHONE (OUTPATIENT)
Dept: FAMILY MEDICINE | Facility: CLINIC | Age: 37
End: 2022-06-24
Payer: MEDICAID

## 2022-06-24 NOTE — TELEPHONE ENCOUNTER
Pt advised  All labs, clearance letter, ekg documents has been faxed to [facility]. [phone]. Pt advised. Pt states she will be coming to clinic to  documents

## 2022-06-24 NOTE — TELEPHONE ENCOUNTER
----- Message from Eveline Hector sent at 6/24/2022  2:24 PM CDT -----  Contact: Patient [phone]  Type: Patient Call Back    Who called: Patient     What is the request in detail: Need to speak to nurse ASAP in regards to getting paperwork faxed for surgery that she's supposed to have done on Mon., 06-27-22. She's being told that some things are still missing from the paperwork. Patient states that she's leaving out today for [place], will have to do Pre Op in the morning, 06-25-22, and surgery is scheduled for Mon.,  06-27-22.  Please call pt ASAP. Need to speak to someone today, 06-24-22.     Would the patient rather a call back or a response via [facility]? Call back    Best call back number: [phone]

## 2022-07-06 RX ORDER — OXYCODONE AND ACETAMINOPHEN 5; 325 MG/1; MG/1
TABLET ORAL
Refills: 0 | OUTPATIENT
Start: 2022-07-06

## 2022-07-06 NOTE — TELEPHONE ENCOUNTER
No new care gaps identified.  [facility] Embedded Care Gaps. Reference number: [id number]. 7/06/2022   1:37:02 PM CDT

## 2022-07-06 NOTE — TELEPHONE ENCOUNTER
LOV  6/16/2022  Last refill 7/18/2019    Patient requesting a return to work note that would allow her to return to work on next Wednesday (7/13/2022).  Please advise.

## 2022-07-06 NOTE — TELEPHONE ENCOUNTER
----- Message from Stephanie Sherman sent at 7/6/2022  1:14 PM CDT -----  Type: RX Refill Request    Who Called: self     Have you contacted your pharmacy:     Refill or New Rx: refill     RX Name and Strength: oxyCODONE-acetaminophen (PERCOCET) 5-325 mg per tablet     How is the patient currently taking it? (ex. 1XDay):    Is this a 30 day or 90 day RX:    Preferred Pharmacy with phone number:    [id number] - [place], [place] [facility] [place] [address] [address] [place] [place] [address]  Phone: [phone] Fax: [phone]        Pt would like to return to work next Wednesday, she would like a note written for her, she would like to pick it up

## 2022-07-07 ENCOUNTER — TELEPHONE (OUTPATIENT)
Dept: FAMILY MEDICINE | Facility: CLINIC | Age: 37
End: 2022-07-07
Payer: MEDICAID

## 2022-07-07 NOTE — TELEPHONE ENCOUNTER
Patient requesting pain medication, states she's still in pain from her surgery. Advised patient that she will need to reach out to the surgeon to follow-up and her surgeon will need to release her to return to work. Patient verbalized understanding.

## 2022-07-07 NOTE — TELEPHONE ENCOUNTER
----- Message from Ellie French sent at 7/7/2022 10:49 AM CDT -----      Name of Who is Calling: RASTA TORRES [[id number]]      What is the request in detail: Pt called to get return to work form and a refill of Percocet 10.Please contact to further discuss and advise.          Can the clinic reply by [facility]: N      What Number to Call Back if not in [id number] - [place], [place] - [address]

## 2022-07-21 ENCOUNTER — TELEPHONE (OUTPATIENT)
Dept: FAMILY MEDICINE | Facility: CLINIC | Age: 37
End: 2022-07-21

## 2022-07-21 NOTE — TELEPHONE ENCOUNTER
----- Message from Minnie Kwong sent at 7/21/2022  1:43 PM CDT -----  Regarding: self [phone]  Type: Patient Call Back    Who called: self    What is the request in detail: pt had  plastic surgery last month and pharmacy is stating Medicaid will not fill the cream she isn't sure if its because its from out of town. She would like to know if provider can send a RX. She isnt sure the name of it, but its or an open wound..   [id number] - [place] [address] [place] [address] [address]  [facility] [place] [address]  Phone: [phone] Fax: [phone]      Can the clinic reply by [facility]? no    Would the patient rather a call back or a response via [facility]?  Call back    Best call back number: [phone]

## 2022-07-21 NOTE — TELEPHONE ENCOUNTER
Patient had recently had surgery stated cream that was prescribe was not covered. Notified best to call surgeon to get them to switch medication or use [employer] for coupon to make it cheaper. Note can send message to PCP but may need to call surgeon due to not being seen regarding cream. Patient will call surgeon.

## 2022-08-02 ENCOUNTER — PATIENT OUTREACH (OUTPATIENT)
Dept: ADMINISTRATIVE | Facility: HOSPITAL | Age: 37
End: 2022-08-02
Payer: MEDICAID

## 2022-08-02 RX ORDER — IBUPROFEN 800 MG/1
TABLET ORAL
COMMUNITY
Start: 2022-07-19

## 2022-08-02 RX ORDER — CEPHALEXIN 500 MG/1
500 CAPSULE ORAL EVERY 8 HOURS
COMMUNITY
Start: 2022-06-28

## 2022-08-02 RX ORDER — OXYCODONE AND ACETAMINOPHEN 10; 325 MG/1; MG/1
1-2 TABLET ORAL EVERY 6 HOURS PRN
COMMUNITY
Start: 2022-06-28

## 2022-08-02 RX ORDER — BACLOFEN 10 MG/1
10 TABLET ORAL EVERY 8 HOURS PRN
COMMUNITY
Start: 2022-06-28

## 2022-08-02 RX ORDER — MEDICAL SUPPLY, MISCELLANEOUS
MISCELLANEOUS MISCELLANEOUS
COMMUNITY
Start: 2022-05-18

## 2022-08-24 ENCOUNTER — PATIENT MESSAGE (OUTPATIENT)
Dept: FAMILY MEDICINE | Facility: CLINIC | Age: 37
End: 2022-08-24
Payer: MEDICAID

## 2022-10-29 ENCOUNTER — HOSPITAL ENCOUNTER (EMERGENCY)
Facility: HOSPITAL | Age: 37
Discharge: HOME | End: 2022-10-29
Attending: EMERGENCY MEDICINE
Payer: MEDICAID

## 2022-10-29 VITALS
BODY MASS INDEX: 24.27 KG/M2 | WEIGHT: 137 LBS | DIASTOLIC BLOOD PRESSURE: 52 MMHG | HEART RATE: 64 BPM | SYSTOLIC BLOOD PRESSURE: 94 MMHG | OXYGEN SATURATION: 100 % | TEMPERATURE: 98 F | RESPIRATION RATE: 20 BRPM | HEIGHT: 63 IN

## 2022-10-29 DIAGNOSIS — M54.50 RIGHT-SIDED LOW BACK PAIN WITHOUT SCIATICA, UNSPECIFIED CHRONICITY: Primary | ICD-10-CM

## 2022-10-29 LAB
B-HCG UR QL: NEGATIVE
CTP QC/QA: YES

## 2022-10-29 PROCEDURE — 99284 EMERGENCY DEPT VISIT MOD MDM: CPT

## 2022-10-29 PROCEDURE — 81025 URINE PREGNANCY TEST: CPT | Performed by: PHYSICIAN ASSISTANT

## 2022-10-29 PROCEDURE — 25000003 PHARM REV CODE 250: Performed by: PHYSICIAN ASSISTANT

## 2022-10-29 RX ORDER — METHOCARBAMOL 500 MG/1
500 TABLET, FILM COATED ORAL 3 TIMES DAILY
Qty: 30 TABLET | Refills: 0 | Status: SHIPPED | OUTPATIENT
Start: 2022-10-29

## 2022-10-29 RX ORDER — NAPROXEN 500 MG/1
500 TABLET ORAL
Status: COMPLETED | OUTPATIENT
Start: 2022-10-29 | End: 2022-10-29

## 2022-10-29 RX ORDER — NAPROXEN 500 MG/1
500 TABLET ORAL 2 TIMES DAILY PRN
Qty: 30 TABLET | Refills: 0 | Status: SHIPPED | OUTPATIENT
Start: 2022-10-29

## 2022-10-29 RX ADMIN — NAPROXEN 500 MG: 500 TABLET ORAL at 04:10

## 2022-10-29 NOTE — ED PROVIDER NOTES
Encounter Date: 10/29/2022       History     Chief Complaint   Patient presents with    Back Pain     Patient reports right lower back pain, denies fall or injury, denies difficulty with bowel or bladder.      Chief Complaint: Back pain  History of  Present Illness: History obtained from patient. This 37 y.o. female who has past medical history of anxiety, asthma, bronchitis and chronic back pain presents to the ED complaining of right lower back pain that has been ongoing for 4 weeks.  Patient states that pain has been gradually worsening and radiates up to her upper back.  Denies urinary incontinence, bowel incontinence, saddle anesthesia, numbness, tingling weakness, dysuria hematuria urinary frequency, abdominal pain, nausea, vomiting, fever.  Patient states that she denies temp treatment for her pain as she is late on her menstrual cycle and was concerned she may be pregnant.  Patient did not take it UPT at home.      Review of patient's allergies indicates:   Allergen Reactions    Adhesive Rash     Pulls skin off     Past Medical History:   Diagnosis Date    Anxiety     Asthma     Bronchitis     Chronic back pain      Past Surgical History:   Procedure Laterality Date    hydrianitis       Family History   Problem Relation Age of Onset    Diabetes Mother     Diabetes Maternal Grandmother     Diabetes Maternal Aunt     Breast cancer Neg Hx     Colon cancer Neg Hx     Ovarian cancer Neg Hx      Social History     Tobacco Use    Smoking status: Former     Packs/day: 0.50     Years: 3.00     Pack years: 1.50     Types: Cigarettes     Start date: 2016     Quit date: 10/10/2021     Years since quittin.0    Smokeless tobacco: Never   Substance Use Topics    Alcohol use: No     Alcohol/week: 0.0 standard drinks    Drug use: No     Review of Systems   Constitutional:  Negative for chills and fever.   HENT:  Negative for congestion, rhinorrhea and sore throat.    Eyes:  Negative for visual disturbance.    Respiratory:  Negative for cough and shortness of breath.    Cardiovascular:  Negative for chest pain.   Gastrointestinal:  Negative for abdominal pain, diarrhea, nausea and vomiting.   Genitourinary:  Negative for dysuria, frequency and hematuria.   Musculoskeletal:  Positive for back pain.   Skin:  Negative for rash.   Neurological:  Negative for dizziness, weakness and headaches.        (-) urinary incontinence  (-) bowel incontinence  (-) saddle anesthesia  (-) urinary retention         Physical Exam     Initial Vitals   BP Pulse Resp Temp SpO2   10/29/22 1536 10/29/22 1536 10/29/22 1536 10/29/22 1536 10/29/22 1539   (!) 94/52 64 20 98.3 °F (36.8 °C) 100 %      MAP       --                Physical Exam    Nursing note and vitals reviewed.  Constitutional: She appears well-developed and well-nourished. No distress.   HENT:   Head: Normocephalic and atraumatic.   Right Ear: Tympanic membrane normal.   Left Ear: Tympanic membrane normal.   Nose: Nose normal.   Mouth/Throat: Uvula is midline, oropharynx is clear and moist and mucous membranes are normal.   Eyes: EOM are normal. Pupils are equal, round, and reactive to light.   Neck: Trachea normal and phonation normal. Neck supple. No stridor present.   Normal range of motion.   Full passive range of motion without pain.     Cardiovascular:  Normal rate, regular rhythm and normal heart sounds.     Exam reveals no gallop and no friction rub.       No murmur heard.  Pulmonary/Chest: Effort normal and breath sounds normal. No respiratory distress. She has no wheezes. She has no rhonchi. She has no rales.   Abdominal: Abdomen is soft. Bowel sounds are normal. There is no abdominal tenderness. There is no rebound and no guarding.   Musculoskeletal:         General: Normal range of motion.      Cervical back: Full passive range of motion without pain, normal range of motion and neck supple. No rigidity. No spinous process tenderness or muscular tenderness. Normal range  of motion.      Comments: No C-spine, T-spine or L-spine midline tenderness.  There is tenderness to the right lumbar paraspinal musculature.  There is full range of motion of the bilateral upper and lower extremities.         Neurological: She is alert and oriented to person, place, and time. She has normal strength. No cranial nerve deficit or sensory deficit.   Skin: Skin is warm and dry. Capillary refill takes less than 2 seconds.   Psychiatric: She has a normal mood and affect.       ED Course   Procedures  Labs Reviewed   POCT URINE PREGNANCY          Imaging Results    None          Medications   naproxen tablet 500 mg (has no administration in time range)     Medical Decision Making:   ED Management:  This is an evaluation of a 37 y.o. female that presents to the Emergency Department for back pain.  Denies fever, rash, night sweats, weight loss, dysuria, bowel/bladder incontinence, or IV\SQ drug use. The patient is a non-toxic, afebrile, and well appearing female. On physical exam, there is tenderness to the right lumbar back. There is no abdominal pain to palpation, CVA tenderness, or saddle anesthesia. Strength and sensation are symmetric bilaterally.  Upper and lower extremity pulses are symmetrical. There is no rash, erythema, open wounds, or increased warmth over the back.      Vital signs reassuring.     Given the above findings, my overall impression is back strain. Given the above findings, I do not think the patient has acute vertebral fracture, subluxation, dislocation, sciatica, epidural abscess, cauda equina, shingles, UTI.    The diagnosis, treatment plan, instructions for follow-up and reevaluation with primary care as well as ED return precautions were discussed and understanding was verbalized. All questions or concerns have been addressed.                          Clinical Impression:   Final diagnoses:  [M54.50] Right-sided low back pain without sciatica, unspecified chronicity (Primary)       ED Disposition Condition    Discharge Stable          ED Prescriptions       Medication Sig Dispense Start Date End Date Auth. Provider    naproxen (NAPROSYN) 500 MG tablet Take 1 tablet (500 mg total) by mouth 2 (two) times daily as needed (Pain). Take With Meals 30 tablet 10/29/2022 -- Ren Schwartz PA-C    methocarbamoL (ROBAXIN) 500 MG Tab Take 1 tablet (500 mg total) by mouth 3 (three) times daily. 30 tablet 10/29/2022 -- Ren Schwartz PA-C          Follow-up Information       Follow up With Specialties Details Why Contact Info    Azikiwe K. Lombard, MD Family Medicine   [address] [place] [address]  [phone]      [facility] Emergency Dept Emergency Medicine Go in 1 day If symptoms worsen [address] [facility] [address] [phone]             Ren Schwartz PA-C  10/29/22 3485

## 2022-10-29 NOTE — ED NOTES
"Pt presents w/ c/o right lower back pain that radiates up to right upper back x 4 weeks.   Denies trauma, vaginal bleeding, d/c or dysuria.  No N/V Last BM yesterday "normal".  Pt is AAOx3, resp even and unlabored, skin warm and dry.  NAD noted.   "

## 2022-10-29 NOTE — Clinical Note
"Marlene Mcgee" Alexander was seen and treated in our emergency department on 10/29/2022.  She may return to work on 11/01/2022.       If you have any questions or concerns, please don't hesitate to call.      Ren Schwartz PA-C"

## 2023-07-11 ENCOUNTER — LAB VISIT (OUTPATIENT)
Dept: LAB | Facility: HOSPITAL | Age: 38
End: 2023-07-11
Attending: OBSTETRICS & GYNECOLOGY
Payer: COMMERCIAL

## 2023-07-11 ENCOUNTER — OFFICE VISIT (OUTPATIENT)
Dept: OBSTETRICS AND GYNECOLOGY | Facility: CLINIC | Age: 38
End: 2023-07-11
Payer: COMMERCIAL

## 2023-07-11 VITALS
WEIGHT: 136.88 LBS | DIASTOLIC BLOOD PRESSURE: 72 MMHG | SYSTOLIC BLOOD PRESSURE: 110 MMHG | BODY MASS INDEX: 24.25 KG/M2

## 2023-07-11 DIAGNOSIS — N92.6 MISSED PERIOD: ICD-10-CM

## 2023-07-11 DIAGNOSIS — Z31.41 FERTILITY TESTING: ICD-10-CM

## 2023-07-11 DIAGNOSIS — N92.6 MISSED PERIOD: Primary | ICD-10-CM

## 2023-07-11 LAB
FSH SERPL-ACNC: 91.96 MIU/ML
HCG INTACT+B SERPL-ACNC: 4.5 MIU/ML
LH SERPL-ACNC: 48.5 MIU/ML
T4 FREE SERPL-MCNC: 0.81 NG/DL (ref 0.71–1.51)
TSH SERPL DL<=0.005 MIU/L-ACNC: 5.45 UIU/ML (ref 0.4–4)

## 2023-07-11 PROCEDURE — 83520 IMMUNOASSAY QUANT NOS NONAB: CPT | Performed by: OBSTETRICS & GYNECOLOGY

## 2023-07-11 PROCEDURE — 99999 PR PBB SHADOW E&M-EST. PATIENT-LVL III: CPT | Mod: PBBFAC,,, | Performed by: OBSTETRICS & GYNECOLOGY

## 2023-07-11 PROCEDURE — 3074F PR MOST RECENT SYSTOLIC BLOOD PRESSURE < 130 MM HG: ICD-10-PCS | Mod: CPTII,S$GLB,, | Performed by: OBSTETRICS & GYNECOLOGY

## 2023-07-11 PROCEDURE — 99204 OFFICE O/P NEW MOD 45 MIN: CPT | Mod: S$GLB,,, | Performed by: OBSTETRICS & GYNECOLOGY

## 2023-07-11 PROCEDURE — 99999 PR PBB SHADOW E&M-EST. PATIENT-LVL III: ICD-10-PCS | Mod: PBBFAC,,, | Performed by: OBSTETRICS & GYNECOLOGY

## 2023-07-11 PROCEDURE — 36415 COLL VENOUS BLD VENIPUNCTURE: CPT | Performed by: OBSTETRICS & GYNECOLOGY

## 2023-07-11 PROCEDURE — 84443 ASSAY THYROID STIM HORMONE: CPT | Performed by: OBSTETRICS & GYNECOLOGY

## 2023-07-11 PROCEDURE — 84702 CHORIONIC GONADOTROPIN TEST: CPT | Performed by: OBSTETRICS & GYNECOLOGY

## 2023-07-11 PROCEDURE — 83002 ASSAY OF GONADOTROPIN (LH): CPT | Performed by: OBSTETRICS & GYNECOLOGY

## 2023-07-11 PROCEDURE — 83001 ASSAY OF GONADOTROPIN (FSH): CPT | Performed by: OBSTETRICS & GYNECOLOGY

## 2023-07-11 PROCEDURE — 83520 IMMUNOASSAY QUANT NOS NONAB: CPT | Mod: 59 | Performed by: OBSTETRICS & GYNECOLOGY

## 2023-07-11 PROCEDURE — 3008F PR BODY MASS INDEX (BMI) DOCUMENTED: ICD-10-PCS | Mod: CPTII,S$GLB,, | Performed by: OBSTETRICS & GYNECOLOGY

## 2023-07-11 PROCEDURE — 1159F PR MEDICATION LIST DOCUMENTED IN MEDICAL RECORD: ICD-10-PCS | Mod: CPTII,S$GLB,, | Performed by: OBSTETRICS & GYNECOLOGY

## 2023-07-11 PROCEDURE — 84439 ASSAY OF FREE THYROXINE: CPT | Performed by: OBSTETRICS & GYNECOLOGY

## 2023-07-11 PROCEDURE — 3008F BODY MASS INDEX DOCD: CPT | Mod: CPTII,S$GLB,, | Performed by: OBSTETRICS & GYNECOLOGY

## 2023-07-11 PROCEDURE — 3074F SYST BP LT 130 MM HG: CPT | Mod: CPTII,S$GLB,, | Performed by: OBSTETRICS & GYNECOLOGY

## 2023-07-11 PROCEDURE — 3078F DIAST BP <80 MM HG: CPT | Mod: CPTII,S$GLB,, | Performed by: OBSTETRICS & GYNECOLOGY

## 2023-07-11 PROCEDURE — 99204 PR OFFICE/OUTPT VISIT, NEW, LEVL IV, 45-59 MIN: ICD-10-PCS | Mod: S$GLB,,, | Performed by: OBSTETRICS & GYNECOLOGY

## 2023-07-11 PROCEDURE — 1159F MED LIST DOCD IN RCRD: CPT | Mod: CPTII,S$GLB,, | Performed by: OBSTETRICS & GYNECOLOGY

## 2023-07-11 PROCEDURE — 99213 OFFICE O/P EST LOW 20 MIN: CPT | Mod: PBBFAC | Performed by: OBSTETRICS & GYNECOLOGY

## 2023-07-11 PROCEDURE — 3078F PR MOST RECENT DIASTOLIC BLOOD PRESSURE < 80 MM HG: ICD-10-PCS | Mod: CPTII,S$GLB,, | Performed by: OBSTETRICS & GYNECOLOGY

## 2023-07-11 NOTE — PROGRESS NOTES
History & Physical  Gynecology      SUBJECTIVE:     Chief Complaint: Absent Menses (2m w/o cycle before it came down on )       History of Present Illness:  Ms. Munoz is a 39 y/o female who presents to clinic to discussed missed periods. She reports that she missed her period in May and  but started his period in 2023. She denies taking any new medications or stopping any. She has not taken Plan B or having any thyroid issues. She denies abnormal stress.    She also reports that she would like to get pregnant and had not gotten pregnant in 14 years. She denies taking birth control. She has a partner who has a 3 year old. She has not had any investigations into her cause.      Review of patient's allergies indicates:   Allergen Reactions    Adhesive Rash     Pulls skin off       Past Medical History:   Diagnosis Date    Anxiety     Asthma     Bronchitis     Chronic back pain      Past Surgical History:   Procedure Laterality Date    hydrianitis       OB History          1    Para   1    Term   0       1    AB   0    Living             SAB   0    IAB   0    Ectopic   0    Multiple        Live Births                   Family History   Problem Relation Age of Onset    Diabetes Mother     Diabetes Maternal Grandmother     Diabetes Maternal Aunt     Breast cancer Neg Hx     Colon cancer Neg Hx     Ovarian cancer Neg Hx      Social History     Tobacco Use    Smoking status: Former     Packs/day: 0.50     Years: 3.00     Pack years: 1.50     Types: Cigarettes     Start date: 2016     Quit date: 10/10/2021     Years since quittin.7    Smokeless tobacco: Never   Substance Use Topics    Alcohol use: No     Alcohol/week: 0.0 standard drinks    Drug use: No       Current Outpatient Medications   Medication Sig    baclofen (LIORESAL) 10 MG tablet Take 10 mg by mouth every 8 (eight) hours as needed.    cephALEXin (KEFLEX) 500 MG capsule Take 500 mg by mouth every 8 (eight) hours.     cyproheptadine (,PERIACTIN,) 2 mg/5 mL syrup Take 5 mLs (2 mg total) by mouth 3 (three) times daily with meals.    ibuprofen (ADVIL,MOTRIN) 800 MG tablet     methocarbamoL (ROBAXIN) 500 MG Tab Take 1 tablet (500 mg total) by mouth 3 (three) times daily.    miscellaneous medical supply (C-TUB) Misc crutches    naproxen (NAPROSYN) 500 MG tablet Take 1 tablet (500 mg total) by mouth 2 (two) times daily as needed (Pain). Take With Meals    oxyCODONE-acetaminophen (PERCOCET)  mg per tablet Take 1-2 tablets by mouth every 6 (six) hours as needed.     No current facility-administered medications for this visit.         Review of Systems:  Review of Systems   Constitutional:  Negative for chills and fever.   Eyes:  Negative for visual disturbance.   Respiratory:  Negative for cough and wheezing.    Cardiovascular:  Negative for chest pain and palpitations.   Gastrointestinal:  Negative for abdominal pain, nausea and vomiting.   Genitourinary:  Positive for menstrual problem. Negative for dysuria, frequency, hematuria, pelvic pain, vaginal bleeding, vaginal discharge and vaginal pain.   Neurological:  Negative for headaches.   Psychiatric/Behavioral:  Negative for depression.       OBJECTIVE:     Physical Exam:  Physical Exam  Vitals and nursing note reviewed.   Constitutional:       Appearance: Normal appearance. She is well-developed.   Cardiovascular:      Rate and Rhythm: Normal rate.   Pulmonary:      Effort: Pulmonary effort is normal. No respiratory distress.   Abdominal:      General: There is no distension.      Palpations: Abdomen is soft.      Tenderness: There is no abdominal tenderness.   Genitourinary:     Exam position: Supine.   Skin:     General: Skin is warm and dry.   Neurological:      Mental Status: She is oriented to person, place, and time.     ASSESSMENT:       ICD-10-CM ICD-9-CM    1. Missed period  N92.6 626.4 POCT urine pregnancy      Follicle Stimulating Hormone      LUTEINIZING HORMONE       TSH      US Pelvis Comp with Transvag NON-OB (xpd      HCG, QUANTITATIVE, PREGNANCY      2. Fertility testing  Z31.41 V26.21 ANTIMULLERIAN HORMONE (AMH)           Plan:      Marlene DIALLO was seen today for absent menses.    Diagnoses and all orders for this visit:    Missed period  -     POCT urine pregnancy  -     Follicle Stimulating Hormone; Future  -     LUTEINIZING HORMONE; Future  -     TSH; Future  -     US Pelvis Comp with Transvag NON-OB (xpd; Future  -     HCG, QUANTITATIVE, PREGNANCY; Future    Fertility testing  -     ANTIMULLERIAN HORMONE (AMH); Future  - Extensive conversation about infertility. Discussed cycles with patient and identified that patient has irregular cycles. Discussed ovulatory, tubal and male factor infertility  - Discussed ovulation with patient and when to expect ovulation.Advised patient to start the use of OPKs to denote id she ovulating.  - Discussed timed intercourse with patient      Orders Placed This Encounter   Procedures    US Pelvis Comp with Transvag NON-OB (xpd    ANTIMULLERIAN HORMONE (AMH)    Follicle Stimulating Hormone    LUTEINIZING HORMONE    TSH    HCG, QUANTITATIVE, PREGNANCY    POCT urine pregnancy       Follow up in about 4 weeks (around 8/8/2023) for Well Woman/Annual/F/U US and labs.    Counseling time: 45 minutes    Ihsan Serrano

## 2023-07-13 ENCOUNTER — HOSPITAL ENCOUNTER (OUTPATIENT)
Dept: RADIOLOGY | Facility: HOSPITAL | Age: 38
Discharge: HOME | End: 2023-07-13
Attending: OBSTETRICS & GYNECOLOGY
Payer: COMMERCIAL

## 2023-07-13 DIAGNOSIS — N92.6 MISSED PERIOD: ICD-10-CM

## 2023-07-13 PROCEDURE — 76856 US EXAM PELVIC COMPLETE: CPT | Mod: TC

## 2023-07-13 PROCEDURE — 76830 US PELVIS COMP WITH TRANSVAG NON-OB (XPD): ICD-10-PCS | Mod: 26,,, | Performed by: RADIOLOGY

## 2023-07-13 PROCEDURE — 76830 TRANSVAGINAL US NON-OB: CPT | Mod: 26,,, | Performed by: RADIOLOGY

## 2023-07-13 PROCEDURE — 76856 US PELVIS COMP WITH TRANSVAG NON-OB (XPD): ICD-10-PCS | Mod: 26,,, | Performed by: RADIOLOGY

## 2023-07-13 PROCEDURE — 76856 US EXAM PELVIC COMPLETE: CPT | Mod: 26,,, | Performed by: RADIOLOGY

## 2023-07-14 LAB — MIS SERPL-MCNC: <0.03 NG/ML (ref 0.15–7.5)

## 2023-07-24 DIAGNOSIS — R79.89 ABNORMAL TSH: Primary | ICD-10-CM

## 2023-08-18 ENCOUNTER — LAB VISIT (OUTPATIENT)
Dept: LAB | Facility: HOSPITAL | Age: 38
End: 2023-08-18
Attending: OBSTETRICS & GYNECOLOGY
Payer: COMMERCIAL

## 2023-08-18 ENCOUNTER — OFFICE VISIT (OUTPATIENT)
Dept: OBSTETRICS AND GYNECOLOGY | Facility: CLINIC | Age: 38
End: 2023-08-18
Payer: COMMERCIAL

## 2023-08-18 ENCOUNTER — TELEPHONE (OUTPATIENT)
Dept: OBSTETRICS AND GYNECOLOGY | Facility: CLINIC | Age: 38
End: 2023-08-18
Payer: COMMERCIAL

## 2023-08-18 VITALS
WEIGHT: 136.38 LBS | SYSTOLIC BLOOD PRESSURE: 108 MMHG | DIASTOLIC BLOOD PRESSURE: 70 MMHG | BODY MASS INDEX: 24.15 KG/M2

## 2023-08-18 DIAGNOSIS — R79.89 ABNORMAL TSH: ICD-10-CM

## 2023-08-18 DIAGNOSIS — Z11.3 SCREENING EXAMINATION FOR STD (SEXUALLY TRANSMITTED DISEASE): ICD-10-CM

## 2023-08-18 DIAGNOSIS — E28.39 PREMATURE OVARIAN FAILURE: ICD-10-CM

## 2023-08-18 DIAGNOSIS — Z01.419 WELL WOMAN EXAM WITH ROUTINE GYNECOLOGICAL EXAM: Primary | ICD-10-CM

## 2023-08-18 PROCEDURE — 3078F DIAST BP <80 MM HG: CPT | Mod: CPTII,S$GLB,, | Performed by: OBSTETRICS & GYNECOLOGY

## 2023-08-18 PROCEDURE — 99395 PREV VISIT EST AGE 18-39: CPT | Mod: S$GLB,,, | Performed by: OBSTETRICS & GYNECOLOGY

## 2023-08-18 PROCEDURE — 86592 SYPHILIS TEST NON-TREP QUAL: CPT | Performed by: OBSTETRICS & GYNECOLOGY

## 2023-08-18 PROCEDURE — 87389 HIV-1 AG W/HIV-1&-2 AB AG IA: CPT | Performed by: OBSTETRICS & GYNECOLOGY

## 2023-08-18 PROCEDURE — 1159F MED LIST DOCD IN RCRD: CPT | Mod: CPTII,S$GLB,, | Performed by: OBSTETRICS & GYNECOLOGY

## 2023-08-18 PROCEDURE — 86376 MICROSOMAL ANTIBODY EACH: CPT | Performed by: OBSTETRICS & GYNECOLOGY

## 2023-08-18 PROCEDURE — 3078F PR MOST RECENT DIASTOLIC BLOOD PRESSURE < 80 MM HG: ICD-10-PCS | Mod: CPTII,S$GLB,, | Performed by: OBSTETRICS & GYNECOLOGY

## 2023-08-18 PROCEDURE — 88175 CYTOPATH C/V AUTO FLUID REDO: CPT | Performed by: OBSTETRICS & GYNECOLOGY

## 2023-08-18 PROCEDURE — 3008F PR BODY MASS INDEX (BMI) DOCUMENTED: ICD-10-PCS | Mod: CPTII,S$GLB,, | Performed by: OBSTETRICS & GYNECOLOGY

## 2023-08-18 PROCEDURE — 81514 NFCT DS BV&VAGINITIS DNA ALG: CPT | Performed by: OBSTETRICS & GYNECOLOGY

## 2023-08-18 PROCEDURE — 87624 HPV HI-RISK TYP POOLED RSLT: CPT | Performed by: OBSTETRICS & GYNECOLOGY

## 2023-08-18 PROCEDURE — 36415 COLL VENOUS BLD VENIPUNCTURE: CPT | Performed by: OBSTETRICS & GYNECOLOGY

## 2023-08-18 PROCEDURE — 3074F SYST BP LT 130 MM HG: CPT | Mod: CPTII,S$GLB,, | Performed by: OBSTETRICS & GYNECOLOGY

## 2023-08-18 PROCEDURE — 3074F PR MOST RECENT SYSTOLIC BLOOD PRESSURE < 130 MM HG: ICD-10-PCS | Mod: CPTII,S$GLB,, | Performed by: OBSTETRICS & GYNECOLOGY

## 2023-08-18 PROCEDURE — 99395 PR PREVENTIVE VISIT,EST,18-39: ICD-10-PCS | Mod: S$GLB,,, | Performed by: OBSTETRICS & GYNECOLOGY

## 2023-08-18 PROCEDURE — 87491 CHLMYD TRACH DNA AMP PROBE: CPT | Performed by: OBSTETRICS & GYNECOLOGY

## 2023-08-18 PROCEDURE — 1159F PR MEDICATION LIST DOCUMENTED IN MEDICAL RECORD: ICD-10-PCS | Mod: CPTII,S$GLB,, | Performed by: OBSTETRICS & GYNECOLOGY

## 2023-08-18 PROCEDURE — 99999 PR PBB SHADOW E&M-EST. PATIENT-LVL IV: ICD-10-PCS | Mod: PBBFAC,,, | Performed by: OBSTETRICS & GYNECOLOGY

## 2023-08-18 PROCEDURE — 99999 PR PBB SHADOW E&M-EST. PATIENT-LVL IV: CPT | Mod: PBBFAC,,, | Performed by: OBSTETRICS & GYNECOLOGY

## 2023-08-18 PROCEDURE — 3008F BODY MASS INDEX DOCD: CPT | Mod: CPTII,S$GLB,, | Performed by: OBSTETRICS & GYNECOLOGY

## 2023-08-18 PROCEDURE — 87591 N.GONORRHOEAE DNA AMP PROB: CPT | Performed by: OBSTETRICS & GYNECOLOGY

## 2023-08-18 PROCEDURE — 80074 ACUTE HEPATITIS PANEL: CPT | Performed by: OBSTETRICS & GYNECOLOGY

## 2023-08-18 NOTE — TELEPHONE ENCOUNTER
No answer on call back. Left vm.       ----- Message from Abilio French sent at 8/17/2023 11:02 AM CDT -----  Type: Patient Call Back    Who called:Self    What is the request in detail:In regards to test results     Can the clinic reply by [facility]?No    Would the patient rather a call back or a response via [facility]? Call    Best call back number:.[phone]    Additional Information:

## 2023-08-18 NOTE — PROGRESS NOTES
History & Physical  Gynecology      SUBJECTIVE:     Chief Complaint: Annual Exam       History of Present Illness:  Annual Exam-Premenopausal  Ms. Munoz is a 37 y/o female who presents to clinic for annual exam. She was last seen in 2023 for fertility concerns. She continue to reports that her period are abnormal only with occasional spotting. She also reports that she would like to get pregnant and had not gotten pregnant in 14 years. She denies taking birth control. She has a partner who has a 3 year old.    The patient is sexually active. GYN screening history: last pap: approximate date  and was normal. The patient wears seatbelts: yes. The patient participates in regular exercise: no. Has the patient ever been transfused or tattooed?: yes. The patient reports that there is not domestic violence in her life.      Review of patient's allergies indicates:   Allergen Reactions    Adhesive Rash     Pulls skin off       Past Medical History:   Diagnosis Date    Anxiety     Asthma     Bronchitis     Chronic back pain      Past Surgical History:   Procedure Laterality Date    hydrianitis       OB History          1    Para   1    Term   0       1    AB   0    Living             SAB   0    IAB   0    Ectopic   0    Multiple        Live Births                   Family History   Problem Relation Age of Onset    Diabetes Mother     Diabetes Maternal Grandmother     Diabetes Maternal Aunt     Breast cancer Neg Hx     Colon cancer Neg Hx     Ovarian cancer Neg Hx      Social History     Tobacco Use    Smoking status: Former     Current packs/day: 0.00     Average packs/day: 0.5 packs/day for 5.4 years (2.7 ttl pk-yrs)     Types: Cigarettes     Start date: 2016     Quit date: 10/10/2021     Years since quittin.8    Smokeless tobacco: Never   Substance Use Topics    Alcohol use: No     Alcohol/week: 0.0 standard drinks of alcohol    Drug use: No       Current Outpatient Medications   Medication  Sig    baclofen (LIORESAL) 10 MG tablet Take 10 mg by mouth every 8 (eight) hours as needed.    cephALEXin (KEFLEX) 500 MG capsule Take 500 mg by mouth every 8 (eight) hours.    cyproheptadine (,PERIACTIN,) 2 mg/5 mL syrup Take 5 mLs (2 mg total) by mouth 3 (three) times daily with meals.    ibuprofen (ADVIL,MOTRIN) 800 MG tablet     methocarbamoL (ROBAXIN) 500 MG Tab Take 1 tablet (500 mg total) by mouth 3 (three) times daily.    miscellaneous medical supply (C-TUB) Misc crutches    naproxen (NAPROSYN) 500 MG tablet Take 1 tablet (500 mg total) by mouth 2 (two) times daily as needed (Pain). Take With Meals    oxyCODONE-acetaminophen (PERCOCET)  mg per tablet Take 1-2 tablets by mouth every 6 (six) hours as needed.     No current facility-administered medications for this visit.       Review of Systems:  Review of Systems   Constitutional:  Negative for chills and fever.   Eyes:  Negative for visual disturbance.   Respiratory:  Negative for cough and wheezing.    Cardiovascular:  Negative for chest pain and palpitations.   Gastrointestinal:  Negative for abdominal pain, nausea and vomiting.   Genitourinary:  Positive for menstrual problem. Negative for dysuria, frequency, hematuria, pelvic pain, vaginal bleeding, vaginal discharge and vaginal pain.   Neurological:  Negative for headaches.   Psychiatric/Behavioral:  Negative for depression.         OBJECTIVE:     Physical Exam:  Physical Exam  Vitals and nursing note reviewed. Exam conducted with a chaperone present.   Constitutional:       Appearance: She is well-developed.      Comments: tearful   Cardiovascular:      Rate and Rhythm: Normal rate.   Pulmonary:      Effort: Pulmonary effort is normal. No respiratory distress.   Chest:   Breasts:     Breasts are symmetrical.   Abdominal:      General: There is no distension.      Palpations: Abdomen is soft.      Tenderness: There is no abdominal tenderness.   Genitourinary:     Vagina: No vaginal discharge.    Skin:     General: Skin is warm and dry.   Neurological:      Mental Status: She is alert and oriented to person, place, and time.           ASSESSMENT:       ICD-10-CM ICD-9-CM    1. Well woman exam with routine gynecological exam  Z01.419 V72.31 Liquid-Based Pap Smear, Screening      HPV High Risk Genotypes, PCR      2. Screening examination for STD (sexually transmitted disease)  Z11.3 V74.5 HIV 1/2 Ag/Ab (4th Gen)      RPR      Hepatitis Panel, Acute      VAGINOSIS SCREEN BY DNA PROBE      C. trachomatis/N. gonorrhoeae by AMP DNA [facility]; Cervicovaginal      3. Premature ovarian failure  E28.39 256.8 Ambulatory referral/consult to Endocrinology        Plan:      Marlene DIALLO was seen today for annual exam.    Diagnoses and all orders for this visit:    Well woman exam with routine gynecological exam  -     Liquid-Based Pap Smear, Screening  -     HPV High Risk Genotypes, PCR    Screening examination for STD (sexually transmitted disease)  -     HIV 1/2 Ag/Ab (4th Gen); Future  -     RPR; Future  -     Hepatitis Panel, Acute; Future  -     VAGINOSIS SCREEN BY DNA PROBE  -     C. trachomatis/N. gonorrhoeae by AMP DNA [facility]; Cervicovaginal    Premature ovarian failure  -     Ambulatory referral/consult to Endocrinology; Future  - Elevated TSH and FSH  - AMH <0.03   - TPA ordered 07/2023  - Discussed with patient that based on her labs and history that she may have premature ovarian failure. Patient tearful but voiced understanding        Orders Placed This Encounter   Procedures    HPV High Risk Genotypes, PCR    VAGINOSIS SCREEN BY DNA PROBE    C. trachomatis/N. gonorrhoeae by AMP DNA [facility]; Cervicovaginal    HIV 1/2 Ag/Ab (4th Gen)    RPR    Hepatitis Panel, Acute    Ambulatory referral/consult to Endocrinology       Follow up in about 1 year (around 8/18/2024) for Well Woman/Annual.    Counseling time: 45 minutes    Ihsan Serrano

## 2023-08-19 LAB
HAV IGM SERPL QL IA: NORMAL
HBV CORE IGM SERPL QL IA: NORMAL
HBV SURFACE AG SERPL QL IA: NORMAL
HCV AB SERPL QL IA: NORMAL
HIV 1+2 AB+HIV1 P24 AG SERPL QL IA: NORMAL
THYROPEROXIDASE IGG SERPL-ACNC: <6 IU/ML

## 2023-08-20 LAB
C TRACH DNA SPEC QL NAA+PROBE: NOT DETECTED
N GONORRHOEA DNA SPEC QL NAA+PROBE: NOT DETECTED

## 2023-08-21 LAB — RPR SER QL: NORMAL

## 2023-08-22 DIAGNOSIS — B96.89 BV (BACTERIAL VAGINOSIS): Primary | ICD-10-CM

## 2023-08-22 DIAGNOSIS — N76.0 BV (BACTERIAL VAGINOSIS): Primary | ICD-10-CM

## 2023-08-22 LAB
BACTERIAL VAGINOSIS DNA: POSITIVE
CANDIDA GLABRATA DNA: NEGATIVE
CANDIDA KRUSEI DNA: NEGATIVE
CANDIDA RRNA VAG QL PROBE: NEGATIVE
T VAGINALIS RRNA GENITAL QL PROBE: NEGATIVE

## 2023-08-22 RX ORDER — METRONIDAZOLE 500 MG/1
500 TABLET ORAL EVERY 12 HOURS
Qty: 14 TABLET | Refills: 0 | Status: SHIPPED | OUTPATIENT
Start: 2023-08-22 | End: 2023-08-29

## 2023-08-23 ENCOUNTER — TELEPHONE (OUTPATIENT)
Dept: OBSTETRICS AND GYNECOLOGY | Facility: CLINIC | Age: 38
End: 2023-08-23
Payer: COMMERCIAL

## 2023-08-23 LAB
HPV HR 12 DNA SPEC QL NAA+PROBE: NEGATIVE
HPV16 AG SPEC QL: NEGATIVE
HPV18 DNA SPEC QL NAA+PROBE: NEGATIVE

## 2023-08-25 LAB
FINAL PATHOLOGIC DIAGNOSIS: NORMAL
Lab: NORMAL

## 2023-08-28 ENCOUNTER — TELEPHONE (OUTPATIENT)
Dept: UROLOGY | Facility: CLINIC | Age: 38
End: 2023-08-28
Payer: COMMERCIAL

## 2023-08-28 NOTE — TELEPHONE ENCOUNTER
Spoke with pt and gave phone number [phone] to schedule with Endocrinology. ANGELY.      ----- Message from Fe Francois sent at 8/28/2023  2:50 PM CDT -----  Regarding: [id number]  Type: Patient Call Back    Who called:self     What is the request in detail: pt states she has been waiting on some one to call her and get her scheduled and she has not heard back, I am reaching out to your office because I see that your office referred her. Pt states she will like a callback from the nurse.     Can the clinic reply by [facility]?no     Would the patient rather a call back or a response via [facility]? Call back     Best call back number:[phone]       Additional Information: I tried to schedule the pt from the endocrinologist referral but the system is bring up Gyn when I go in the schedule from the referral

## 2023-09-06 ENCOUNTER — OFFICE VISIT (OUTPATIENT)
Dept: OBSTETRICS AND GYNECOLOGY | Facility: CLINIC | Age: 38
End: 2023-09-06
Payer: COMMERCIAL

## 2023-09-06 VITALS
SYSTOLIC BLOOD PRESSURE: 110 MMHG | BODY MASS INDEX: 23.98 KG/M2 | DIASTOLIC BLOOD PRESSURE: 74 MMHG | WEIGHT: 135.38 LBS

## 2023-09-06 DIAGNOSIS — E28.39 PREMATURE OVARIAN FAILURE: Primary | ICD-10-CM

## 2023-09-06 PROCEDURE — 99213 OFFICE O/P EST LOW 20 MIN: CPT | Mod: S$GLB,,, | Performed by: OBSTETRICS & GYNECOLOGY

## 2023-09-06 PROCEDURE — 1159F MED LIST DOCD IN RCRD: CPT | Mod: CPTII,S$GLB,, | Performed by: OBSTETRICS & GYNECOLOGY

## 2023-09-06 PROCEDURE — 1160F PR REVIEW ALL MEDS BY PRESCRIBER/CLIN PHARMACIST DOCUMENTED: ICD-10-PCS | Mod: CPTII,S$GLB,, | Performed by: OBSTETRICS & GYNECOLOGY

## 2023-09-06 PROCEDURE — 99999 PR PBB SHADOW E&M-EST. PATIENT-LVL III: ICD-10-PCS | Mod: PBBFAC,,, | Performed by: OBSTETRICS & GYNECOLOGY

## 2023-09-06 PROCEDURE — 99213 PR OFFICE/OUTPT VISIT, EST, LEVL III, 20-29 MIN: ICD-10-PCS | Mod: S$GLB,,, | Performed by: OBSTETRICS & GYNECOLOGY

## 2023-09-06 PROCEDURE — 3074F SYST BP LT 130 MM HG: CPT | Mod: CPTII,S$GLB,, | Performed by: OBSTETRICS & GYNECOLOGY

## 2023-09-06 PROCEDURE — 3078F PR MOST RECENT DIASTOLIC BLOOD PRESSURE < 80 MM HG: ICD-10-PCS | Mod: CPTII,S$GLB,, | Performed by: OBSTETRICS & GYNECOLOGY

## 2023-09-06 PROCEDURE — 99999 PR PBB SHADOW E&M-EST. PATIENT-LVL III: CPT | Mod: PBBFAC,,, | Performed by: OBSTETRICS & GYNECOLOGY

## 2023-09-06 PROCEDURE — 3008F PR BODY MASS INDEX (BMI) DOCUMENTED: ICD-10-PCS | Mod: CPTII,S$GLB,, | Performed by: OBSTETRICS & GYNECOLOGY

## 2023-09-06 PROCEDURE — 1159F PR MEDICATION LIST DOCUMENTED IN MEDICAL RECORD: ICD-10-PCS | Mod: CPTII,S$GLB,, | Performed by: OBSTETRICS & GYNECOLOGY

## 2023-09-06 PROCEDURE — 1160F RVW MEDS BY RX/DR IN RCRD: CPT | Mod: CPTII,S$GLB,, | Performed by: OBSTETRICS & GYNECOLOGY

## 2023-09-06 PROCEDURE — 3008F BODY MASS INDEX DOCD: CPT | Mod: CPTII,S$GLB,, | Performed by: OBSTETRICS & GYNECOLOGY

## 2023-09-06 PROCEDURE — 3074F PR MOST RECENT SYSTOLIC BLOOD PRESSURE < 130 MM HG: ICD-10-PCS | Mod: CPTII,S$GLB,, | Performed by: OBSTETRICS & GYNECOLOGY

## 2023-09-06 PROCEDURE — 3078F DIAST BP <80 MM HG: CPT | Mod: CPTII,S$GLB,, | Performed by: OBSTETRICS & GYNECOLOGY

## 2023-09-06 NOTE — PROGRESS NOTES
[facility]  Ambulatory Clinic  Obstetrics & Gynecology    Visit Date:  2023    Chief Complaint:  Fertility advice    History of Present Illness:      Marlene Munoz is a 38 y.o. , new pt to me, here to discuss fertility advice.    Pt has known low ovarian reserve.  Pt had abnormal AMH and FSH.  Overall still has monthly periods, lasting 3 days, light.  Pelvic US showed small fibroid.  Pt seen Dr. Camacho for fertility in the past.  Pt denies abnormal vaginal bleeding, vaginal discharge, dysmenorrhea, dyspareunia, pelvic pain, bloating, early satiety, unintentional weight loss, breast mass/skin changes, incontinence, GI or urinary complaints.    Otherwise, the pt is in her usual state of health.    Review of Systems:      GENERAL:  No fever, fatigue, excessive weight gain or loss  HEENT:  No headaches, hearing changes, visual disturbance  RESPIRATORY:  No cough, shortness of breath  CARDIOVASCULAR:  No chest pain, heart palpitations, leg swelling  BREAST:  No lump, pain, nipple discharge, skin changes  GASTROINTESTINAL:  No nausea, vomiting, constipation, diarrhea, abd pain, rectal bleeding   GENITOURINARY:  See HPI    Physical Exam:     /74   Wt 61.4 kg (135 lb 5.8 oz)   LMP 2023 (Exact Date)   BMI 23.98 kg/m²   Pulse 60's, Resp rate 14     GENERAL:  No acute distress, well-nourished  HEENT:  Atraumatic, anicteric, moist mucus membranes  LUNGS:  Clear normal respiratory effort  HEART:  Regular rate and rhythm  ABDOMEN:  Soft, non-tender, non-distended  EXT:  Symmetric w/o cramping, claudication, or edema. +2 distal pulses.  SKIN:  No rashes or bruising  PSYCH:  Mood and affect appropriate  NEURO:  Grossly intact bilaterally  PELVIC:  Patient deferred today    Chaperone present for exam.    Component      Latest Ref Rng 2023   FSH      See Text mIU/mL 91.96      Component      Latest Ref Rng 2023   Anti Mullerian Hormone (AMH)      0.15 - 7.5 ng/mL <0.03  (L)       Lab Results   Component Value Date    TSH 5.454 (H) 2023     Pelvic US:  2023    FINDINGS:  Uterus:     Size: 9.5 x 3.9 x 4.9 cm     Masses: Uterine fibroid measuring 1.0 x 0.6 x 0.8 cm.     Endometrium: Normal in this pre menopausal patient, measuring 5 mm.     Right ovary:     Size: 3.4 x 2.7 x 2.2 cm     Appearance: Normal with a few follicles.     Vascular flow: Normal.     Left ovary:     Size: 2.4 x 1.5 x 0.9 cm.  Cm     Appearance: Normal     Vascular Flow: Normal.     Free Fluid:     Trace amount of free fluid in the pelvis.     Impression:     Uterine fibroid.    Assessment:     38 y.o. :    Concern for premature ovarian failure    Plan:    Discussed concern for premature ovarian failure based on workup so far.  Pt advised to see [facility] for fertility treatment ASAP, local provider information given to pt.  Encourage healthy lifestyle modifications.  F/u with PCP for health maintenance.   Return 1 year for gynecologic exam, or sooner as needed.   All questions answered, pt voiced understanding.        Young Schwartz MD

## 2023-09-13 PROBLEM — N92.1 MENORRHAGIA WITH IRREGULAR CYCLE: Status: RESOLVED | Noted: 2022-01-10 | Resolved: 2023-09-13

## 2024-03-14 NOTE — TELEPHONE ENCOUNTER
----- Message from Marlene Rey sent at 3/21/2022  8:36 AM CDT -----  Regarding: Patient call back  Who called:RASTA TORRES [[id number]]    What is the request in detail: Patient is requesting a call back. She states she has been on her period for 3 weeks and would like to know if she can get a script for birth control or something to stop her period. She would like to sent to [id number] - [place] [address]. She states she also needs a pre op some tie next month.   Please advise.    Can the clinic reply by [facility]? No    Best call back number: [phone]    Additional Information: N/A     pt has her meds